# Patient Record
Sex: FEMALE | Race: WHITE
[De-identification: names, ages, dates, MRNs, and addresses within clinical notes are randomized per-mention and may not be internally consistent; named-entity substitution may affect disease eponyms.]

---

## 2017-03-20 ENCOUNTER — HOSPITAL ENCOUNTER (OUTPATIENT)
Dept: HOSPITAL 75 - PREOP | Age: 67
Discharge: HOME | End: 2017-03-20
Attending: SURGERY
Payer: MEDICARE

## 2017-03-20 ENCOUNTER — HOSPITAL ENCOUNTER (OUTPATIENT)
Dept: HOSPITAL 75 - SDC | Age: 67
End: 2017-03-20
Attending: NURSE PRACTITIONER
Payer: MEDICARE

## 2017-03-20 VITALS — DIASTOLIC BLOOD PRESSURE: 81 MMHG | SYSTOLIC BLOOD PRESSURE: 169 MMHG

## 2017-03-20 VITALS — DIASTOLIC BLOOD PRESSURE: 78 MMHG | SYSTOLIC BLOOD PRESSURE: 171 MMHG

## 2017-03-20 VITALS — SYSTOLIC BLOOD PRESSURE: 171 MMHG | DIASTOLIC BLOOD PRESSURE: 78 MMHG

## 2017-03-20 VITALS — WEIGHT: 241 LBS | BODY MASS INDEX: 41.15 KG/M2 | HEIGHT: 64 IN

## 2017-03-20 VITALS — SYSTOLIC BLOOD PRESSURE: 162 MMHG | DIASTOLIC BLOOD PRESSURE: 86 MMHG

## 2017-03-20 VITALS — BODY MASS INDEX: 41.15 KG/M2 | HEIGHT: 64 IN | WEIGHT: 241 LBS

## 2017-03-20 VITALS — DIASTOLIC BLOOD PRESSURE: 77 MMHG | SYSTOLIC BLOOD PRESSURE: 175 MMHG

## 2017-03-20 VITALS — DIASTOLIC BLOOD PRESSURE: 86 MMHG | SYSTOLIC BLOOD PRESSURE: 162 MMHG

## 2017-03-20 VITALS — DIASTOLIC BLOOD PRESSURE: 75 MMHG | SYSTOLIC BLOOD PRESSURE: 186 MMHG

## 2017-03-20 VITALS — SYSTOLIC BLOOD PRESSURE: 175 MMHG | DIASTOLIC BLOOD PRESSURE: 77 MMHG

## 2017-03-20 DIAGNOSIS — K21.9: ICD-10-CM

## 2017-03-20 DIAGNOSIS — D64.9: Primary | ICD-10-CM

## 2017-03-20 DIAGNOSIS — Z01.818: Primary | ICD-10-CM

## 2017-03-20 DIAGNOSIS — Z11.2: ICD-10-CM

## 2017-03-20 PROCEDURE — 85014 HEMATOCRIT: CPT

## 2017-03-20 PROCEDURE — 86901 BLOOD TYPING SEROLOGIC RH(D): CPT

## 2017-03-20 PROCEDURE — 86920 COMPATIBILITY TEST SPIN: CPT

## 2017-03-20 PROCEDURE — 86900 BLOOD TYPING SEROLOGIC ABO: CPT

## 2017-03-20 PROCEDURE — 36415 COLL VENOUS BLD VENIPUNCTURE: CPT

## 2017-03-20 PROCEDURE — 87081 CULTURE SCREEN ONLY: CPT

## 2017-03-20 PROCEDURE — 85018 HEMOGLOBIN: CPT

## 2017-03-20 PROCEDURE — 86850 RBC ANTIBODY SCREEN: CPT

## 2017-03-20 PROCEDURE — 36430 TRANSFUSION BLD/BLD COMPNT: CPT

## 2017-03-20 NOTE — XMS REPORT
Continuity of Care Document

 Created on: 2015



RAY SILVESTRE

External Reference #: [id number]

: 1950

Sex: Female



Demographics







 Address  [address] [place]  [address]

 

 Home Phone  [phone]

 

 Preferred Language  English

 

 Marital Status  Unknown

 

 Ep[employer]opalian Affiliation  Unknown

 

 Race  Unknown

 

 Ethnic Group  Unknown





Author







 Author  [facility]

 

 Organization  [facility]

 

 Address  Unknown

 

 Phone  Unavailable







Support







 Name  Relationship  Address  Phone

 

 BRITTNI PIPER MD  Caregiver  Unknown  [phone]

 

 HERRERA SESAY MD  Caregiver  [address], [place]  [address] [phone]

 

 ERIC SILVESTREHAWNA  Next Of Kin  [address] 

[place]  [address]  [phone]







Care Team Providers







 Care Team Member Name  Role  Phone

 

 BRITTNI PIPER MD  PCP  [phone]







Insurance Providers







 Payer Name  Policy Number  Subscriber Name  Relationship

 

 [employer] Medicare  [id number]  Ray Silvestre  18 Self / Same As Patient

 

 Blue [employer] [facility] Supp  [id number]  Ray Silvestre  18 Self / Same As Patient







Advance Directives







 Directive  Response  Recorded Date/Time

 

 Advance Directives  No  03/25/15 8:33am

 

 Health Care Power of   No  03/25/15 8:33am

 

 Organ Donor  Yes  03/25/15 8:33am

 

 Resuscitation Status  Full Code  03/25/15 8:33am







Problems

No known problems or medical conditions.



Medications







 Medication  Dose  Route  Sig  Days/Qty  Instructions  Order Date  Discontinued 
Date  Status

 

 Metformin HCl (Glucophage)  1 Each  PO  DAILY        10/26/11  12/12/11  
Discontinued

 

 Levothyroxine Sodium (Levothroid)  1 Each  PO  DAILY        10/26/11  03/25/15
  Discontinued

 

 Hydrochlorothiazide  1 Each  PO  DAILY        10/26/11  12/12/11  Discontinued

 

 Simvastatin  40 Mg  PO  DAILY        10/26/11  03/25/15  Discontinued

 

 Sertraline Hcl  100 Mg  PO  DAILY        10/26/11  03/25/15  Discontinued

 

 Orphenadrine Citrate  100 Mg  PO  TWICE A DAY        10/26/11  12/12/11  
Discontinued

 

 Topiramate  50 Mg  PO  AM        10/26/11  03/25/15  Discontinued

 

 Topiramate  100 Mg  PO  BEDTIME        10/26/11  03/25/15  Discontinued

 

 Acetaminophen/Hydrocodone Bitart  1 Each  PO  Q6 PRN     DO NOT TAKE THIS MED 
WHILE  10/26/11  03/25/15  Discontinued

 

 Fexofenadine Hcl  180 Mg  PO  DAILY PRN        10/26/11  01/25/12  Discontinued

 

 Multivitamins  1 Tab  PO  DAILY        10/26/11  03/25/15  Discontinued

 

 Calcium Carbonate/Vitamin D3  1 Each  PO  DAILY        10/26/11  03/25/15  
Discontinued

 

 Sitagliptin Phosphate  1 Each  PO  DAILY        11  
Discontinued

 

 Lisinopril  40 Mg  PO  DAILY        11  Discontinued

 

 Esomeprazole Magnesium  1 Cap  PO  DAILY        11  
Discontinued

 

 Oxycodone Hcl/Acetaminophen  1 - 2 Each  PO  4-6HRS PRN  40 Qty   MOUTH EVERY 4
-6 HRS AS  11  Discontinued

 

 Omeprazole  40 Mg  PO  DAILY        01/25/12  03/25/15  Discontinued

 

 Fenofibrate  1 Each  PO  DAILY        12  Discontinued

 

 Linagliptin  5 Mg  PO  DAILY        09/14/12  03/25/15  Discontinued

 

 Dicyclomine Hcl  10 Mg  PO  THREE TIMES A DAY        09/14/12  03/25/15  
Discontinued

 

 Simvastatin  40 Mg  PO  DAILY        09/14/12  03/25/15  Discontinued

 

 Loratadine  10 Mg  PO  DAILY        09/14/12  03/25/15  Discontinued

 

 Cranberry Extract  200 Mg  PO  DAILY        09/14/12  03/25/15  Discontinued

 

 Cholecalciferol (Vitamin D3)  4,000 Unit  PO  DAILY        09/14/12  03/25/15  
Discontinued

 

 Topiramate  100 Mg  PO  TWICE A DAY        03/25/15     Active

 

 Prenatal Vit/Fe Fumarate/Fa  1 Tab  PO  DAILY        03/25/15     Active

 

 Ca Carbonate/Vitamin D3/Vit K  1 Tab  PO  DAILY        03/25/15     Active

 

 Cholecalciferol (Vitamin D3)  1,000 Unit  PO  DAILY        03/25/15     Active

 

 Metformin HCl (Glucophage)  750 Mg  PO  TWICE A DAY WITH MEALS        03/25/15
  03/25/15  Discontinued

 

 Oxybutynin Chloride (Ditropan)  5 Mg  PO  TWICE A DAY        03/25/15     
Active

 

 Cyanocobalamin  2,500 Mcg  SL  DAILY        03/25/15     Active

 

 Hydrochlorothiazide  25 Mg  PO  DAILY        03/25/15     Active

 

 Potassium Chloride  20 Meq  PO  DAILY        03/25/15     Active

 

 Atorvastatin  20 Mg  PO  DAILY        03/25/15     Active

 

 Levothyroxine Sodium (Levothroid)  100 Mcg  PO  DAILY        03/25/15     
Active

 

 Omeprazole  20 Mg  PO  DAILY        03/25/15  03/25/15  Discontinued

 

 Linaclotide  145 Mcg  PO  DAILY        03/25/15     Active

 

 Esomeprazole Magnesium  40 Mg  PO  1000        03/25/15  03/25/15  Discontinued

 

 Tetrahydrozoline Hcl  1 Drop  OU  DAILY PRN DRY EYES        03/25/15     Active

 

 Metformin Hcl  750 Mg  PO  TWICE A DAY     TAKES 1 & 1/2 (500MG) TABLET  03/25/
15     Active

 

 Pantoprazole Sodium  40 Mg  PO  TWICE A DAY  90 Qty     03/25/15     Active







Social History







 Social History Problem  Response  Recorded Date/Time

 

 Recent Foreign Travel  No  2015 8:47am

 

 Smoking Status  Former Smoker  2015 8:40am

 

 Do you dip or chew tobacco?  No  2015 8:40am









 Query  Response  Start Date  Stop Date

 

 Smoking Status  Former Smoker     1987







Hospital Discharge Instructions

No hospital discharge instructions.



Plan of Care

No plan of care.



Functional Status

No functional status results.



Allergies, Adverse Reactions, Alerts







 Allergen  Type  Severity  Reaction  Status  Last Updated

 

 Adhesive Bandage  Allergy  Unknown     Active  03/25/15







Immunizations







 Name  Given  Type

 

 Date of Pneumonia Vaccine  14  Historical

 

 Date of Influenza Vaccine  10/24/14  Historical







Vital Signs

Acute Vital Signs





 Vital  Response  Date/Time

 

 Temperature (Fahrenheit)  97.5 degrees F (97.6 - 99.5)   

 

 Temperature (Calculated Celsius)  36.10999 degrees C (36.4 - 37.5)   

 

 Temperature Source  Tympanic     

 

 Pulse Rate (adult)  68 bpm (60 - 90)   

 

 Respiratory Rate  18 bpm (12 - 24)   

 

 O2 Sat by Pulse Oximetry  97 % (88 - 100)   

 

 Blood Pressure  108/53 mm Hg   

 

 Pain      

 

    Pain Intensity  0     

 

 Height (Feet)  5 feet    

 

 Height (Inches)  4.00 inches    

 

 Height (Calculated Centimeters)  162.621513 cm    

 

 Weight (Pounds)  234 pounds    

 

 Weight (Calculated Grams)  015097.616 gm    

 

 Weight (Calculated Kilograms)  106.776812 kilograms    

 

 Height  5 ft 4 in    

 

 Weight  234 lb    

 

 Body Mass Index  40.2 kg/m^2    







Results

No known relevant diagnostic tests, laboratory data and/or discharge summary.



Procedures







 Procedure  Status  Date  Provider(s)

 

 Esophagogastroduodenoscopy (EGD) with dilation  completed  03/25/15  HERRERA SESAY MD







Encounters







 Encounter  Location  Date/Time

 

 Registered Surgical Day Care  [facility]  03/25/15 8:05am

 

 Registered Clinic  [facility]  03/19/15 9:48am

## 2017-03-20 NOTE — XMS REPORT
Continuity of Care Document

 Created on: 2015



RAY SILVESTRE

External Reference #: [id number]

: 1950

Sex: Female



Demographics







 Address  [address] [place]  [address]

 

 Home Phone  [phone]

 

 Preferred Language  English

 

 Marital Status  Unknown

 

 Yarsani Affiliation  Unknown

 

 Race  Unknown

 

 Ethnic Group  Unknown





Author







 Author  [facility]

 

 Organization  [facility]

 

 Address  Unknown

 

 Phone  Unavailable







Support







 Name  Relationship  Address  Phone

 

 BRITTNI PIPER MD  Caregiver  Unknown  [phone]

 

 HERRERA SESAY MD  Caregiver  [address] [facility] [address] [facility], [place]  [address] [phone]

 

 ERIC SILVESTREHAWNA  Next Of Kin  [address] 

[place]  [address]  [phone]







Care Team Providers







 Care Team Member Name  Role  Phone

 

 BRITTNI PIPER MD  PCP  [phone]







Insurance Providers







 Payer Name  Policy Number  Subscriber Name  Relationship

 

 [employer] Medicare  [id number]  Ray Silvestre  18 Self / Same As Patient

 

 Blue Cross [facility] Supp  [id number]  Ray Silvestre  18 Self / Same As Patient







Advance Directives







 Directive  Response  Recorded Date/Time

 

 Advance Directives  No  03/25/15 8:33am

 

 Health Care Power of   No  03/25/15 8:33am

 

 Organ Donor  Yes  03/25/15 8:33am

 

 Resuscitation Status  Full Code  03/25/15 8:33am







Problems

No known problems or medical conditions.



Medications







 Medication  Dose  Route  Sig  Days/Qty  Instructions  Order Date  Discontinued 
Date  Status

 

 Metformin HCl (Glucophage)  1 Each  PO  DAILY        10/26/11  12/12/11  
Discontinued

 

 Levothyroxine Sodium (Levothroid)  1 Each  PO  DAILY        10/26/11  03/25/15
  Discontinued

 

 Hydrochlorothiazide  1 Each  PO  DAILY        10/26/11  12/12/11  Discontinued

 

 Simvastatin  40 Mg  PO  DAILY        10/26/11  03/25/15  Discontinued

 

 Sertraline Hcl  100 Mg  PO  DAILY        10/26/11  03/25/15  Discontinued

 

 Orphenadrine Citrate  100 Mg  PO  TWICE A DAY        10/26/11  12/12/11  
Discontinued

 

 Topiramate  50 Mg  PO  AM        10/26/11  03/25/15  Discontinued

 

 Topiramate  100 Mg  PO  BEDTIME        10/26/11  03/25/15  Discontinued

 

 Acetaminophen/Hydrocodone Bitart  1 Each  PO  Q6 PRN     DO NOT TAKE THIS MED 
WHILE  10/26/11  03/25/15  Discontinued

 

 Fexofenadine Hcl  180 Mg  PO  DAILY PRN        10/26/11  01/25/12  Discontinued

 

 Multivitamins  1 Tab  PO  DAILY        10/26/11  03/25/15  Discontinued

 

 Calcium Carbonate/Vitamin D3  1 Each  PO  DAILY        10/26/11  03/25/15  
Discontinued

 

 Sitagliptin Phosphate  1 Each  PO  DAILY        11  
Discontinued

 

 Lisinopril  40 Mg  PO  DAILY        11  Discontinued

 

 Esomeprazole Magnesium  1 Cap  PO  DAILY        11  
Discontinued

 

 Oxycodone Hcl/Acetaminophen  1 - 2 Each  PO  4-6HRS PRN  40 Qty   MOUTH EVERY 4
-6 HRS AS  11  Discontinued

 

 Omeprazole  40 Mg  PO  DAILY        01/25/12  03/25/15  Discontinued

 

 Fenofibrate  1 Each  PO  DAILY        12  Discontinued

 

 Linagliptin  5 Mg  PO  DAILY        09/14/12  03/25/15  Discontinued

 

 Dicyclomine Hcl  10 Mg  PO  THREE TIMES A DAY        09/14/12  03/25/15  
Discontinued

 

 Simvastatin  40 Mg  PO  DAILY        09/14/12  03/25/15  Discontinued

 

 Loratadine  10 Mg  PO  DAILY        09/14/12  03/25/15  Discontinued

 

 Cranberry Extract  200 Mg  PO  DAILY        09/14/12  03/25/15  Discontinued

 

 Cholecalciferol (Vitamin D3)  4,000 Unit  PO  DAILY        09/14/12  03/25/15  
Discontinued

 

 Topiramate  100 Mg  PO  TWICE A DAY        03/25/15     Active

 

 Prenatal Vit/Fe Fumarate/Fa  1 Tab  PO  DAILY        03/25/15     Active

 

 Ca Carbonate/Vitamin D3/Vit K  1 Tab  PO  DAILY        03/25/15     Active

 

 Cholecalciferol (Vitamin D3)  1,000 Unit  PO  DAILY        03/25/15     Active

 

 Metformin HCl (Glucophage)  750 Mg  PO  TWICE A DAY WITH MEALS        03/25/15
  03/25/15  Discontinued

 

 Oxybutynin Chloride (Ditropan)  5 Mg  PO  TWICE A DAY        03/25/15     
Active

 

 Cyanocobalamin  2,500 Mcg  SL  DAILY        03/25/15     Active

 

 Hydrochlorothiazide  25 Mg  PO  DAILY        03/25/15     Active

 

 Potassium Chloride  20 Meq  PO  DAILY        03/25/15     Active

 

 Atorvastatin  20 Mg  PO  DAILY        03/25/15     Active

 

 Levothyroxine Sodium (Levothroid)  100 Mcg  PO  DAILY        03/25/15     
Active

 

 Omeprazole  20 Mg  PO  DAILY        03/25/15  03/25/15  Discontinued

 

 Linaclotide  145 Mcg  PO  DAILY        03/25/15     Active

 

 Esomeprazole Magnesium  40 Mg  PO  1000        03/25/15  03/25/15  Discontinued

 

 Tetrahydrozoline Hcl  1 Drop  OU  DAILY PRN DRY EYES        03/25/15     Active

 

 Metformin Hcl  750 Mg  PO  TWICE A DAY     TAKES 1 & 1/2 (500MG) TABLET  03/25/
15     Active

 

 Pantoprazole Sodium  40 Mg  PO  TWICE A DAY  90 Qty     03/25/15     Active







Social History







 Social History Problem  Response  Recorded Date/Time

 

 Recent Foreign Travel  No  2015 8:47am

 

 Smoking Status  Former Smoker  2015 8:40am

 

 Do you dip or chew tobacco?  No  2015 8:40am









 Query  Response  Start Date  Stop Date

 

 Smoking Status  Former Smoker     1987







Hospital Discharge Instructions

No hospital discharge instructions.



Plan of Care

No plan of care.



Functional Status

No functional status results.



Allergies, Adverse Reactions, Alerts







 Allergen  Type  Severity  Reaction  Status  Last Updated

 

 Adhesive Bandage  Allergy  Unknown     Active  03/25/15







Immunizations







 Name  Given  Type

 

 Date of Pneumonia Vaccine  14  Historical

 

 Date of Influenza Vaccine  10/24/14  Historical







Vital Signs

Acute Vital Signs





 Vital  Response  Date/Time

 

 Temperature (Fahrenheit)  97.5 degrees F (97.6 - 99.5)   

 

 Temperature (Calculated Celsius)  36.99439 degrees C (36.4 - 37.5)   

 

 Temperature Source  Tympanic     

 

 Pulse Rate (adult)  68 bpm (60 - 90)   

 

 Respiratory Rate  18 bpm (12 - 24)   

 

 O2 Sat by Pulse Oximetry  97 % (88 - 100)   

 

 Blood Pressure  108/53 mm Hg   

 

 Pain      

 

    Pain Intensity  0     

 

 Height (Feet)  5 feet    

 

 Height (Inches)  4.00 inches    

 

 Height (Calculated Centimeters)  162.285115 cm    

 

 Weight (Pounds)  234 pounds    

 

 Weight (Calculated Grams)  414736.616 gm    

 

 Weight (Calculated Kilograms)  106.370680 kilograms    

 

 Height  5 ft 4 in    

 

 Weight  234 lb    

 

 Body Mass Index  40.2 kg/m^2    







Results

No known relevant diagnostic tests, laboratory data and/or discharge summary.



Procedures







 Procedure  Status  Date  Provider(s)

 

 Esophagogastroduodenoscopy (EGD) with dilation  completed  03/25/15  HERRERA SESAY MD







Encounters







 Encounter  Location  Date/Time

 

 Registered Surgical Day Care  [facility]  03/25/15 8:05am

 

 Registered Clinic  [facility]  03/19/15 9:48am

## 2017-03-23 ENCOUNTER — HOSPITAL ENCOUNTER (INPATIENT)
Dept: HOSPITAL 75 - 4TH | Age: 67
Discharge: HOME | DRG: 989 | End: 2017-03-23
Attending: SURGERY | Admitting: SURGERY
Payer: MEDICARE

## 2017-03-23 VITALS — HEIGHT: 64 IN | BODY MASS INDEX: 41.15 KG/M2 | WEIGHT: 241 LBS

## 2017-03-23 VITALS — DIASTOLIC BLOOD PRESSURE: 68 MMHG | SYSTOLIC BLOOD PRESSURE: 131 MMHG

## 2017-03-23 VITALS — SYSTOLIC BLOOD PRESSURE: 137 MMHG | DIASTOLIC BLOOD PRESSURE: 61 MMHG

## 2017-03-23 VITALS — SYSTOLIC BLOOD PRESSURE: 131 MMHG | DIASTOLIC BLOOD PRESSURE: 68 MMHG

## 2017-03-23 VITALS — SYSTOLIC BLOOD PRESSURE: 130 MMHG | DIASTOLIC BLOOD PRESSURE: 61 MMHG

## 2017-03-23 VITALS — DIASTOLIC BLOOD PRESSURE: 79 MMHG | SYSTOLIC BLOOD PRESSURE: 144 MMHG

## 2017-03-23 DIAGNOSIS — I10: ICD-10-CM

## 2017-03-23 DIAGNOSIS — E11.9: ICD-10-CM

## 2017-03-23 DIAGNOSIS — D64.9: ICD-10-CM

## 2017-03-23 DIAGNOSIS — K95.09: Primary | ICD-10-CM

## 2017-03-23 DIAGNOSIS — G47.33: ICD-10-CM

## 2017-03-23 DIAGNOSIS — K21.9: ICD-10-CM

## 2017-03-23 DIAGNOSIS — K27.9: ICD-10-CM

## 2017-03-23 LAB
BASOPHILS # BLD AUTO: 0.1 10^3/UL (ref 0–0.1)
BASOPHILS NFR BLD AUTO: 1 % (ref 0–10)
EOSINOPHIL # BLD AUTO: 0.3 10^3/UL (ref 0–0.3)
EOSINOPHIL NFR BLD AUTO: 4 % (ref 0–10)
ERYTHROCYTE [DISTWIDTH] IN BLOOD BY AUTOMATED COUNT: 23.5 % (ref 10–14.5)
LYMPHOCYTES # BLD AUTO: 1.9 X 10^3 (ref 1–4)
LYMPHOCYTES NFR BLD AUTO: 26 % (ref 12–44)
MCH RBC QN AUTO: 21 PG (ref 25–34)
MCHC RBC AUTO-ENTMCNC: 30 G/DL (ref 32–36)
MCV RBC AUTO: 70 FL (ref 80–99)
MONOCYTES # BLD AUTO: 0.5 X 10^3 (ref 0–1)
MONOCYTES NFR BLD AUTO: 8 % (ref 0–12)
NEUTROPHILS # BLD AUTO: 4.5 X 10^3 (ref 1.8–7.8)
NEUTROPHILS NFR BLD AUTO: 62 % (ref 42–75)
PLATELET # BLD: 267 10^3/UL (ref 130–400)
PMV BLD AUTO: 10 FL (ref 7.4–10.4)
RBC # BLD AUTO: 4.96 10^6/UL (ref 4.35–5.85)
WBC # BLD AUTO: 7.2 10^3/UL (ref 4.3–11)

## 2017-03-23 PROCEDURE — 82962 GLUCOSE BLOOD TEST: CPT

## 2017-03-23 PROCEDURE — 85014 HEMATOCRIT: CPT

## 2017-03-23 PROCEDURE — 86901 BLOOD TYPING SEROLOGIC RH(D): CPT

## 2017-03-23 PROCEDURE — 85025 COMPLETE CBC W/AUTO DIFF WBC: CPT

## 2017-03-23 PROCEDURE — 36430 TRANSFUSION BLD/BLD COMPNT: CPT

## 2017-03-23 PROCEDURE — 0DP64CZ REMOVAL OF EXTRALUMINAL DEVICE FROM STOMACH, PERCUTANEOUS ENDOSCOPIC APPROACH: ICD-10-PCS | Performed by: SURGERY

## 2017-03-23 PROCEDURE — 85018 HEMOGLOBIN: CPT

## 2017-03-23 PROCEDURE — 94664 DEMO&/EVAL PT USE INHALER: CPT

## 2017-03-23 PROCEDURE — 36415 COLL VENOUS BLD VENIPUNCTURE: CPT

## 2017-03-23 RX ADMIN — SODIUM CHLORIDE, SODIUM LACTATE, POTASSIUM CHLORIDE, AND CALCIUM CHLORIDE PRN MLS/HR: 600; 310; 30; 20 INJECTION, SOLUTION INTRAVENOUS at 06:40

## 2017-03-23 RX ADMIN — MORPHINE SULFATE PRN MG: 10 INJECTION, SOLUTION INTRAMUSCULAR; INTRAVENOUS at 10:38

## 2017-03-23 RX ADMIN — MORPHINE SULFATE PRN MG: 10 INJECTION, SOLUTION INTRAMUSCULAR; INTRAVENOUS at 10:31

## 2017-03-23 RX ADMIN — SODIUM CHLORIDE, SODIUM LACTATE, POTASSIUM CHLORIDE, AND CALCIUM CHLORIDE PRN MLS/HR: 600; 310; 30; 20 INJECTION, SOLUTION INTRAVENOUS at 09:32

## 2017-03-23 NOTE — ANESTHESIA-GENERAL POST-OP
General


Patient Condition


Mental Status/LOC:  Same as Preop


Cardiovascular:  Satisfactory


Nausea/Vomiting:  Absent


Respiratory:  Satisfactory


Pain:  Controlled


Complications:  Absent





Post Op Complications


Complications


None





Follow Up Care/Instructions


Patient Instructions


None needed.





Anesthesia/Patient Condition


Patient Condition


Patient is doing well, no complaints, stable vital signs, no apparent adverse 

anesthesia problems.   


No complications reported per nursing.


D/C home per [facility] Criteria:  ESHA Goyal DO Mar 23, 2017 11:03

## 2017-03-23 NOTE — OPERATIVE REPORT
PROCEDURE PHYSICIAN:   HERRERA SERNA

 

DATE OF PROCEDURE:  

03/23/2017

 

ATTENDING PRIMARY CARE PHYSICIAN: Dr. Federica Sin. 

 

PREOPERATIVE DIAGNOSIS:

1.   Gastroesophageal reflux disease.

2.   Peptic ulcer disease.

3.   Anemia requiring blood transfusion. 

 

POSTOPERATIVE DIAGNOSIS: 

1.   Gastroesophageal reflux disease.

2.   Peptic ulcer disease.

3.   Anemia requiring blood transfusion.  

4.   Mild inferior gastric restrictive device slippage. 

 

PROCEDURE:

Diagnostic laparoscopy and removal gastric restrictive device and

access port. 

 

SURGEON:

Dr. Serna. 

 

ASSISTANT:

ARIELLE Wells  

 

ANESTHESIA:

General endotracheal. 

 

ESTIMATED BLOOD LOSS:

Minimal. 

 

FINDINGS:

Mild inferior band slippage. There was hepatomegaly and liver

steatosis. 

 

DISPOSITION:

The patient tolerated the procedure well. 

 

BRIEF HISTORY:

Ms. Clarissa Rosas is a 66-year-old female known to us. She is

status post laparoscopic adjustable gastric band with an [employer]er[employer]

BG band July 2006 in [place]. She has followed-up with us on

multiple times for postoperative counseling as well as

adjustments. She has had a long-standing history of

gastroesophageal reflux disease; however, has had worsening

issues with reflux as well as regurgitation despite having fluid

removed from the band. She does state that she exercises

regularly at the [facility] 3 to 4 times a week for 60 minutes

sessions. She does follow a high protein diet with lean meat

protein sources and does take at least 64 fluid ounces of water

daily. She has taken multiple PPI acid reducers including Nexium,

Protonix and Carafate. She was found to be anemic on several

occasions requiring blood transfusion well. 

 

The patient was brought to the operating room, laid supine on the

table. After adequate IV pain and sedative medications and

general endotracheal intubation the abdomen was prepped and

draped in the standard surgical fashion. 

 

0.5% Marcaine with epinephrine was then used to anesthetize the

overlying skin in left upper abdominal quadrant. A small

transverse incision made using a 15 blade. An 0 silk suture was

applied to the medial aspect of the incision for retraction. A

Veress needle inserted with a low opening pressure of 0 mmHg the

abdomen was then insufflated to 15 mmHg pressure. The Veress

needle removed and a 5 mm Xcel trocar placed followed by a 5 mm,

45 degrees angle laparoscope, visualizing the peritoneal cavity.

 

 

A four-quadrant abdominal exploration was performed. The gastric

restrictive device was not visualized at this time. There was 

moderate hepatomegaly with liver steatosis identified as well.

Under direct visualization we then proceeded to place the two mid

abdominal 10 mm ports after the skin and peritoneum were

anesthetized using 0.5% Marcaine and a transverse skin incision

made using a 15 blade. An area in the epigastric region was then

anesthetized and a small skin incision made using an 11 blade.

Through this Incision a tract through the abdominal wall layers

was created using trocar to a 5 mm port. Through this opening, a

medium size Emery liver retractor was placed in the left lobe

of the liver and retracted anteriorly and superiorly. 

 

The patient was then placed in a steep reverse Trendelenburg

position. The gastric restrictive device was identified and

appeared to be a slightly enlarged gastric pouch and a mild

inferior band slippage. Due to this and her history of anemia it

was decided to remove the gastric restrictive device as well at

the access port. The adhesion tissue to the band buckle was taken

down using Sonicision. The band was then unclasped.   The band

tubing was cut next to the port and the entire band removed

intact. Good hemostasis was observed.  

 

The 10 mm port, fascia and peritoneum were then closed under

direct visualization using a Lj-Yvonne device and 0 Vicryl

suture. The abdomen was desufflated. The skin incision to the mid

abdominal right of midline port was then extended laterally and

the entire port attached to the fascia as well as the sutures

were removed using electrocautery. Good hemostasis was observed.

The subcutaneous tissue was then reapproximated using 3-0 Vicryl

interrupted sutures. Remaining port removed and all skin

incisions were closed using 4-0 Monocryl running subcuticular

sutures. Wounds were then cleaned and covered Dermabond. 

 

The patient tolerated the procedure well. We will start IV pain

and oral medication as well as a clear liquid diet. Once she is

tolerating clears, has good pain control with oral pain

medications and ambulating well, we will discharge her home. We

will recommend a clear liquid diet for the next week and then

slowly advanced as tolerated. We will also want her to continue

with her PPI acid reducer on a daily basis. 

 

 

 

Job ID: [id number]

Dictated Date: 03/23/2017 10:18:01 

Transcription Date: 03/23/2017 11:52:23 / ap

## 2017-03-23 NOTE — PROGRESS NOTE-PRE OPERATIVE
Pre-Operative Progress Note


H&P Reviewed


The H&P was reviewed, patient examined and no changes noted.


Date H&P Reviewed:  Mar 23, 2017


Time H&P Reviewed:  07:40


Pre-Operative Diagnosis:  Reflux, regurgation, abdominal pain











AMY SEO Mar 23, 2017 07:53

## 2017-03-23 NOTE — PROGRESS NOTE-POST OPERATIVE
Post-Operative Progess Note


Assistant


farzad grey APRN





Pre-Operative Diagnosis


Reflux, regurgation, anemia, abdominal pain





Post-Operative Diagnosis





same, mild inferior band slippage





Post-Op Procedure Note


Date of Procedure:  Mar 23, 2017


Name of Procedure:  


diagnostic laparoscopy and adjustable gastric restrictitive device and


port removal


Anesthesia Type


GET


Estimated blood loss (mL):  minimal











HERRERA SESAY MD Mar 23, 2017 10:04 am

## 2017-03-23 NOTE — DISCHARGE INST-SURGICAL
D/C Lap Instructions-LÁZARO


New, Converted, or Re-Newed RX:  RX on Chart


Follow Up Appt in 2 weeks





Activity as tolerated


No driving for 24 hours


No driving while on pain medications





Incentive Spirometry use every 2 hours while awake





clear liquid  Diet 3 days then advance as tolerated





Symptoms to Report: Fever over 101 degree F, Nausea/Vomiting 


Infection Signs and Symptoms to report:  Increased redness, Foul odor of wound, 

Increased drainage





Bathing instructions: May shower


Operative Area Clean/Dry;  Keep incision clean/dry





If any problems/questions: Contact your physician or go to Emergency Room











HERRERA SESAY MD Mar 23, 2017 10:09 am

## 2017-03-26 NOTE — XMS REPORT
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 DX, LEFT                                                        
   2013                

 

                     49242                                                     
     BONE MINERAL DENSITY, HEEL US (IN HOUSE)                                  
                         08/15/2013                

 

                     39483                                                     
     DEXA BONE DENSITY, AXIAL                                                  
         08/15/2013                

 

                     63848                                                     
     INJ TENDON SHEATH/LIGAMENT                                                
           2013                

 

                     54631                                                     
     MAMMOGRAM, SCREENING                                                      
     2013                

 

                     81800                                                     
     ROUTINE VENIPUNCTURE                                                      
     2013                

 

                     70583                                                     
     A1C (IN-HOUSE)                                                                           

 

                     25602                                                     
     CMP                                                           2013  
              

 

                     32811                                                     
     LIPID PANEL                                                                           

 

                     [id number]                                                   
       GFR CALC (RESULT ONLY)                                                  
         2013                

 

                     21794                                                     
     TSH                                                           2013  
              

 

                                                                          
     FLU ADMINISTRATION (MEDICARE ONLY)                                        
                   2013                

 

                     85311                                                     
     A1C (IN-HOUSE)                                                                           

 

                     93490                                                     
     BARIUM SWALLOW XRAY MODIFIED                                              
             2014                

 

                     66189                                                     
     US THYROID ULTRASOUND                                                     
      2014                

 

                     03769                                                     
     TSH                                                           2014  
              

 

                     86900                                                     
     CBC                                                           2014  
              

 

                     01248                                                     
     ROUTINE VENIPUNCTURE                                                      
     2014                

 

                     50024                                                     
     CBC                                                           2014  
              

 

                     24326                                                     
     T4 FREE                                                           2014                

 

                     97337                                                     
     T3 TOTAL                                                           2014                

 

                     23286                                                     
     ROUTINE VENIPUNCTURE                                                      
     2014                

 

                     51539                                                     
     IRON SERUM                                                           2014                

 

                     45198                                                     
     IRON BNDNG CAP                                                                           

 

                     03927                                                     
     T4 FREE                                                           2014                

 

                     56876                                                     
     TSH                                                           2014  
              

 

                     05752                                                     
     FERRITIN                                                           2014                

 

                     [id number]                                                   
       IMMATURE PLATELET FRACTION (RESULT ONLY)                                
                           2014                

 

                     [id number]                                                   
       COMPLETE BLOOD COUNT NO  DIFF (CBC Result)                              
                             2014                

 

                     38671                                                     
     DIFFERENTIAL WBC COUNT (CBC DIFF RESULT)                                  
                         2014                

 

                     32658                                                     
     RETICULOCYTE COUNT                                                        
   2014                

 

                     93916                                                     
     PERIPHERAL BLOOD SMEAR W/ PATH REPORT                                     
                      07/10/2014                

 

                     IRGROUP                                                   
       IRON GROUP (Iron,TIBC, Ferritin)                                        
                   07/10/2014                

 

                     85227                                                     
     PERIPHERIAL BLOOD SMEAR                                                   
        07/10/2014                

 

                     [id number]                                                   
       HEMATOLOGY OTHER REPORT                                                 
          07/10/2014                

 

                     90333                                                     
     HEMOCCULT                                                           2014                

 

                     09375                                                     
     HEMOCCULT                                                           2014                



                                                                               
                                                                               
                                                                               
                                                                               
                                                                               
                                                                         



Results

                      





 Test                    Result                    Range                









 RED CELLS LEUKO REDUCED AS1 - 17 13:14                









 RED CELLS LEUKO REDUCED AS1                                   TRANSFUSED      1409                       NRG                









 Blood type T Indirect antibody screen panel - 17 13:14                









 ABO+Rh group                    AP                     NRG                

 

 Transfusion band number                    [id number]                     NRG    
            

 

 Blood group antibody screen                    NEGATIVE                     
NRG                









 Methicillin resistant Staphylococcus aureus (MRSA) screening culture -  13:19                









 Methicillin resistant Staphylococcus aureus (MRSA) screening culture          
          NEG                     NRG                









 Whole blood hemoglobin and hematocrit panel - 17 21:30                









 Venous blood hemoglobin measurement (mass/volume)                    9.6 g/dL 
                   11.5-16.0                

 

 Blood hematocrit (volume fraction)                    32 %                    
35-52                









 Complete blood count (CBC) with automated white blood cell (WBC) differential 
- 17 06:41                









 Blood leukocytes automated count (number/volume)                    7.2 10*3/
uL                    4.3-11.0                

 

 Blood erythrocytes automated count (number/volume)                    4.96 10*6
/uL                    4.35-5.85                

 

 Venous blood hemoglobin measurement (mass/volume)                    10.4 g/dL
                    11.5-16.0                

 

 Blood hematocrit (volume fraction)                    35 %                    
35-52                

 

 Automated erythrocyte mean corpuscular volume                    70 [foz_us]  
                  80-99                

 

 Automated erythrocyte mean corpuscular hemoglobin (mass per erythrocyte)      
              21 pg                    25-34                

 

 Automated erythrocyte mean corpuscular hemoglobin concentration measurement (
mass/volume)                    30 g/dL                    32-36                

 

 Automated erythrocyte distribution width ratio                    23.5 %      
              10.0-14.5                

 

 Automated blood platelet count (count/volume)                    267 10*3/uL  
                  130-400                

 

 Automated blood platelet mean volume measurement                    10.0 [foz_
us]                    7.4-10.4                

 

 Automated blood neutrophils/100 leukocytes                    62 %            
        42-75                

 

 Automated blood lymphocytes/100 leukocytes                    26 %            
        12-44                

 

 Blood monocytes/100 leukocytes                    8 %                    0-12 
               

 

 Automated blood eosinophils/100 leukocytes                    4 %             
       0-10                

 

 Automated blood basophils/100 leukocytes                    1 %               
     0-10                

 

 Blood neutrophils automated count (number/volume)                    4.5 10*3 
                   1.8-7.8                

 

 Blood lymphocytes automated count (number/volume)                    1.9 10*3 
                   1.0-4.0                

 

 Blood monocytes automated count (number/volume)                    0.5 10*3   
                 0.0-1.0                

 

 Automated eosinophil count                    0.3 10*3/uL                    
0.0-0.3                

 

 Automated blood basophil count (count/volume)                    0.1 10*3/uL  
                  0.0-0.1                









 Capillary blood glucose measurement by glucometer (mass/volume) - 17 06:
59                









 Capillary blood glucose measurement by glucometer (mass/volume)               
     147 mg/dL                                    



                                                                               
                                                 



Encounters

                      





 ACCT No.                    Visit Date/Time                    Discharge      
              Status                    Pt. Type                    Provider   
                 Facility                    Loc./Unit                    
Complaint                

 

 [id number]                    2014 09:39:00                    2014 23:
59:59                    [facility]                    Outpatient                    
AYAD IBARRA MD                                                              
                 

 

 [id number]                    2014 13:32:00                    2014 23:
59:59                    [facility]                    Outpatient                    
CHRISTINE AHUJA MD                                                            
                   

 

 [id number]                    2014 12:02:00                    2014 23:
59:59                    [facility]                    Outpatient                    
CHRISTINE AHUJA MD                                                            
                   

 

 [id number]                    2014 09:30:00                    2014 23:
59:59                    [facility]                    Outpatient                    
WINSOME MULLINS DO                                                               
                

 

 [id number]                    2014 08:53:00                    2014 23:
59:59                    [facility]                    Outpatient                    
CHRISTINE AHUJA MD                                                            
                   

 

 [id number]                    2014 13:30:00                    2014 23:
59:59                    [facility]                    Outpatient                    
CHRISTINE AHUJA MD                                                            
                   

 

 [id number]                    2014 08:48:00                    2014 23:
59:59                    [facility]                    Outpatient                    
CHRISTINE AHUJA MD                                                            
                   

 

 [id number]                    2014 09:40:00                    2014 23:
59:59                    [facility]                    Outpatient                    
CHRISTINE AHUJA MD                                                            
                   

 

 [id number]                    2014 09:08:00                    2014 23:
59:59                    CLS                    Outpatient                    
CHRISTINE AHUJA MD                                                            
                   

 

 [id number]                    2013 15:35:00                    2013 23:
59:59                    CLS                    Outpatient                    
WINSOME MULLINS DO                                                               
                

 

 [id number]                    2013 10:32:00                                 
                             Document Registration                             
                                                                       

 

 [id number]                    2013 13:48:00                                 
                             Document Registration                             
                                                                       

 

 [id number]                    08/15/2013 09:58:00                                 
                             Document Registration

## 2017-03-26 NOTE — XMS REPORT
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                                                     
     MAMMOGRAM DX, LEFT                                                        
   2013                

 

                     91907                                                     
     BONE MINERAL DENSITY, HEEL US (IN HOUSE)                                  
                         08/15/2013                

 

                     80633                                                     
     DEXA BONE DENSITY, AXIAL                                                  
         08/15/2013                

 

                     67050                                                     
     INJ TENDON SHEATH/LIGAMENT                                                
           2013                

 

                     46612                                                     
     MAMMOGRAM, SCREENING                                                      
     2013                

 

                     06494                                                     
     ROUTINE VENIPUNCTURE                                                      
     2013                

 

                     86079                                                     
     A1C (IN-HOUSE)                                                                           

 

                     28075                                                     
     CMP                                                           2013  
              

 

                     88419                                                     
     LIPID PANEL                                                                           

 

                     [id number]                                                   
       GFR CALC (RESULT ONLY)                                                  
         2013                

 

                     92901                                                     
     TSH                                                           2013  
              

 

                                                                          
     FLU ADMINISTRATION (MEDICARE ONLY)                                        
                   2013                

 

                     98165                                                     
     A1C (IN-HOUSE)                                                                           

 

                     22651                                                     
     BARIUM SWALLOW XRAY MODIFIED                                              
             2014                

 

                     51704                                                     
     US THYROID ULTRASOUND                                                     
      2014                

 

                     48040                                                     
     TSH                                                           2014  
              

 

                     81084                                                     
     CBC                                                           2014  
              

 

                     26679                                                     
     ROUTINE VENIPUNCTURE                                                      
     2014                

 

                     08702                                                     
     CBC                                                           2014  
              

 

                     71495                                                     
     T4 FREE                                                           2014                

 

                     98331                                                     
     T3 TOTAL                                                           2014                

 

                     57957                                                     
     ROUTINE VENIPUNCTURE                                                      
     2014                

 

                     65438                                                     
     IRON SERUM                                                           2014                

 

                     07905                                                     
     IRON BNDNG CAP                                                                           

 

                     71703                                                     
     T4 FREE                                                           2014                

 

                     25260                                                     
     TSH                                                           2014  
              

 

                     08601                                                     
     FERRITIN                                                           2014                

 

                     [id number]                                                   
       IMMATURE PLATELET FRACTION (RESULT ONLY)                                
                           2014                

 

                     [id number]                                                   
       COMPLETE BLOOD COUNT NO  DIFF (CBC Result)                              
                             2014                

 

                     03843                                                     
     DIFFERENTIAL WBC COUNT (CBC DIFF RESULT)                                  
                         2014                

 

                     83187                                                     
     RETICULOCYTE COUNT                                                        
   2014                

 

                     52636                                                     
     PERIPHERAL BLOOD SMEAR W/ PATH REPORT                                     
                      07/10/2014                

 

                     IRGROUP                                                   
       IRON GROUP (Iron,TIBC, Ferritin)                                        
                   07/10/2014                

 

                     10577                                                     
     PERIPHERIAL BLOOD SMEAR                                                   
        07/10/2014                

 

                     [id number]                                                   
       HEMATOLOGY OTHER REPORT                                                 
          07/10/2014                

 

                     32506                                                     
     HEMOCCULT                                                           2014                

 

                     82790                                                     
     HEMOCCULT                                                           2014                



                                                                               
                                                                               
                                                                               
                                                                               
                                                                               
                                                                         



Results

                      





 Test                    Result                    Range                









 RED CELLS LEUKO REDUCED AS1 - 17 13:14                









 RED CELLS LEUKO REDUCED AS1                                   TRANSFUSED      1409                       NRG                









 Blood type T Indirect antibody screen panel - 17 13:14                









 ABO+Rh group                    AP                     NRG                

 

 Transfusion band number                    [id number]                     NRG    
            

 

 Blood group antibody screen                    NEGATIVE                     
NRG                









 Methicillin resistant Staphylococcus aureus (MRSA) screening culture -  13:19                









 Methicillin resistant Staphylococcus aureus (MRSA) screening culture          
          NEG                     NRG                









 Whole blood hemoglobin and hematocrit panel - 17 21:30                









 Venous blood hemoglobin measurement (mass/volume)                    9.6 g/dL 
                   11.5-16.0                

 

 Blood hematocrit (volume fraction)                    32 %                    
35-52                









 Complete blood count (CBC) with automated white blood cell (WBC) differential 
- 17 06:41                









 Blood leukocytes automated count (number/volume)                    7.2 10*3/
uL                    4.3-11.0                

 

 Blood erythrocytes automated count (number/volume)                    4.96 10*6
/uL                    4.35-5.85                

 

 Venous blood hemoglobin measurement (mass/volume)                    10.4 g/dL
                    11.5-16.0                

 

 Blood hematocrit (volume fraction)                    35 %                    
35-52                

 

 Automated erythrocyte mean corpuscular volume                    70 [foz_us]  
                  80-99                

 

 Automated erythrocyte mean corpuscular hemoglobin (mass per erythrocyte)      
              21 pg                    25-34                

 

 Automated erythrocyte mean corpuscular hemoglobin concentration measurement (
mass/volume)                    30 g/dL                    32-36                

 

 Automated erythrocyte distribution width ratio                    23.5 %      
              10.0-14.5                

 

 Automated blood platelet count (count/volume)                    267 10*3/uL  
                  130-400                

 

 Automated blood platelet mean volume measurement                    10.0 [foz_
us]                    7.4-10.4                

 

 Automated blood neutrophils/100 leukocytes                    62 %            
        42-75                

 

 Automated blood lymphocytes/100 leukocytes                    26 %            
        12-44                

 

 Blood monocytes/100 leukocytes                    8 %                    0-12 
               

 

 Automated blood eosinophils/100 leukocytes                    4 %             
       0-10                

 

 Automated blood basophils/100 leukocytes                    1 %               
     0-10                

 

 Blood neutrophils automated count (number/volume)                    4.5 10*3 
                   1.8-7.8                

 

 Blood lymphocytes automated count (number/volume)                    1.9 10*3 
                   1.0-4.0                

 

 Blood monocytes automated count (number/volume)                    0.5 10*3   
                 0.0-1.0                

 

 Automated eosinophil count                    0.3 10*3/uL                    
0.0-0.3                

 

 Automated blood basophil count (count/volume)                    0.1 10*3/uL  
                  0.0-0.1                









 Capillary blood glucose measurement by glucometer (mass/volume) - 17 06:
59                









 Capillary blood glucose measurement by glucometer (mass/volume)               
     147 mg/dL                                    



                                                                               
                                                 



Encounters

                      





 ACCT No.                    Visit Date/Time                    Discharge      
              Status                    Pt. Type                    Provider   
                 Facility                    Loc./Unit                    
Complaint                

 

 [id number]                    2014 09:39:00                    2014 23:
59:59                    [facility]                    Outpatient                    
AYAD IBARRA MD                                                              
                 

 

 [id number]                    2014 13:32:00                    2014 23:
59:59                    [facility]                    Outpatient                    
CHRISTINE AHUJA MD                                                            
                   

 

 [id number]                    2014 12:02:00                    2014 23:
59:59                    [facility]                    Outpatient                    
CHRISTINE AHUJA MD                                                            
                   

 

 [id number]                    2014 09:30:00                    2014 23:
59:59                    [facility]                    Outpatient                    
WINSOME MULLINS DO                                                               
                

 

 [id number]                    2014 08:53:00                    2014 23:
59:59                    [facility]                    Outpatient                    
CHRISTINE AHUJA MD                                                            
                   

 

 [id number]                    2014 13:30:00                    2014 23:
59:59                    [facility]                    Outpatient                    
CHRISTINE AHUJA MD                                                            
                   

 

 [id number]                    2014 08:48:00                    2014 23:
59:59                    [facility]                    Outpatient                    
CHRISTINE AHUJA MD                                                            
                   

 

 [id number]                    2014 09:40:00                    2014 23:
59:59                    [facility]                    Outpatient                    
CHRISTINE AHUJA MD                                                            
                   

 

 [id number]                    2014 09:08:00                    2014 23:
59:59                    [facility]                    Outpatient                    
CHRISTINE AHUJA MD                                                            
                   

 

 [id number]                    2013 15:35:00                    2013 23:
59:59                    [facility]S                    Outpatient                    
WINSOME MULLINS DO                                                               
                

 

 [id number]                    2013 10:32:00                                 
                             Document Registration                             
                                                                       

 

 [id number]                    2013 13:48:00                                 
                             Document Registration                             
                                                                       

 

 [id number]                    08/15/2013 09:58:00                                 
                             Document Registration

## 2017-04-03 ENCOUNTER — HOSPITAL ENCOUNTER (OUTPATIENT)
Dept: HOSPITAL 75 - RAD | Age: 67
End: 2017-04-03
Attending: FAMILY MEDICINE
Payer: MEDICARE

## 2017-04-03 DIAGNOSIS — Z12.31: Primary | ICD-10-CM

## 2017-04-03 PROCEDURE — 77067 SCR MAMMO BI INCL CAD: CPT

## 2017-04-04 NOTE — DIAGNOSTIC IMAGING REPORT
EXAMINATION: Digital Mammogram bilateral screening.



INDICATION: Screening.



COMPARISON: This study was compared to the prior exams of

03/30/2016, 09/06/2013, and 08/24/2012.



At this time, there are no current complaints.



The current study was also evaluated with a Computer Aided

Detection (CAD) system.



FINDINGS: There are scattered fibroglandular densities in both

breasts which could obscure a lesion. Overall, there does not

appear to have been any significant change when compared to the

prior exam. No primary or secondary sign of malignancy is noted.



IMPRESSION: There is no radiographic evidence for malignancy.



ACR BI-RADS Category 1 NEGATIVE.

Result letter will be mailed to the patient.

Note: At least 10% of breast cancer is not imaged by mammography.



Dictated by:



Dictated on workstation # [id number]

## 2017-04-06 ENCOUNTER — HOSPITAL ENCOUNTER (OUTPATIENT)
Dept: HOSPITAL 75 - RAD | Age: 67
End: 2017-04-06
Attending: FAMILY MEDICINE
Payer: MEDICARE

## 2017-04-06 DIAGNOSIS — T81.4XXA: Primary | ICD-10-CM

## 2017-04-06 LAB
ALBUMIN SERPL-MCNC: 4.1 G/DL (ref 3.2–4.5)
ALT SERPL-CCNC: 33 U/L (ref 0–55)
ANION GAP SERPL CALC-SCNC: 10 MMOL/L (ref 5–14)
AST SERPL-CCNC: 40 U/L (ref 5–34)
BASOPHILS # BLD AUTO: 0.1 10^3/UL (ref 0–0.1)
BASOPHILS NFR BLD AUTO: 1 % (ref 0–10)
BILIRUB SERPL-MCNC: 0.7 MG/DL (ref 0.1–1)
BUN SERPL-MCNC: 15 MG/DL (ref 7–18)
BUN/CREAT SERPL: 15
CALCIUM SERPL-MCNC: 9.4 MG/DL (ref 8.5–10.1)
CHLORIDE SERPL-SCNC: 107 MMOL/L (ref 98–107)
CO2 SERPL-SCNC: 25 MMOL/L (ref 21–32)
CREAT SERPL-MCNC: 1.03 MG/DL (ref 0.6–1.3)
EOSINOPHIL # BLD AUTO: 0.4 10^3/UL (ref 0–0.3)
EOSINOPHIL NFR BLD AUTO: 7 % (ref 0–10)
ERYTHROCYTE [DISTWIDTH] IN BLOOD BY AUTOMATED COUNT: 25 % (ref 10–14.5)
GFR SERPLBLD BASED ON 1.73 SQ M-ARVRAT: 53 ML/MIN
GLUCOSE SERPL-MCNC: 129 MG/DL (ref 70–105)
LYMPHOCYTES # BLD AUTO: 1.5 X 10^3 (ref 1–4)
LYMPHOCYTES NFR BLD AUTO: 28 % (ref 12–44)
MCH RBC QN AUTO: 21 PG (ref 25–34)
MCHC RBC AUTO-ENTMCNC: 30 G/DL (ref 32–36)
MCV RBC AUTO: 72 FL (ref 80–99)
MONOCYTES # BLD AUTO: 0.5 X 10^3 (ref 0–1)
MONOCYTES NFR BLD AUTO: 9 % (ref 0–12)
NEUTROPHILS # BLD AUTO: 2.9 X 10^3 (ref 1.8–7.8)
NEUTROPHILS NFR BLD AUTO: 54 % (ref 42–75)
PLATELET # BLD: 317 10^3/UL (ref 130–400)
PMV BLD AUTO: 10.3 FL (ref 7.4–10.4)
POTASSIUM SERPL-SCNC: 4.3 MMOL/L (ref 3.6–5)
PROT SERPL-MCNC: 7 G/DL (ref 6.4–8.2)
RBC # BLD AUTO: 4.67 10^6/UL (ref 4.35–5.85)
SODIUM SERPL-SCNC: 142 MMOL/L (ref 135–145)
WBC # BLD AUTO: 5.4 10^3/UL (ref 4.3–11)

## 2017-04-06 PROCEDURE — 36415 COLL VENOUS BLD VENIPUNCTURE: CPT

## 2017-04-06 PROCEDURE — 85025 COMPLETE CBC W/AUTO DIFF WBC: CPT

## 2017-04-06 PROCEDURE — 80053 COMPREHEN METABOLIC PANEL: CPT

## 2017-04-06 PROCEDURE — 74178 CT ABD&PLV WO CNTR FLWD CNTR: CPT

## 2017-04-06 NOTE — DIAGNOSTIC IMAGING REPORT
PROCEDURE: CT abdomen and pelvis with and without contrast.



TECHNIQUE: Precontrast acquisitions were acquired through the

abdomen and pelvis. Multiple contiguous axial images were

obtained through the abdomen and pelvis after the administration

of intravenous contrast.



INDICATION:  T81.4xxa.



INDICATION: Abdominal pain.



100 mL of Omnipaque 350 is administered intravenously.



FINDINGS:

The lung bases demonstrate no significant abnormality. The liver

demonstrates mild diffuse hepatic steatosis. The gallbladder,

the spleen, the adrenal glands, and the pancreas appear

unremarkable. The kidneys have symmetric enhancement and

contrast excretion. There is a lesion in the lower pole of the

left kidney measuring 2.5 cm compatible with a simple cyst. The

abdominal aorta is normal in caliber. No para-aortic

significantly enlarged lymph node seen. There is a fluid

collection measuring 6.5 x 4.3 x 4.2 cm seen in the anterior

abdominal wall subcutaneous tissues.



There is a small fat-containing umbilical hernia. There is

diastasis of the recti.



The pelvis demonstrate surgical clips in the right adnexa. The

urinary bladder appears unremarkable. Unenhanced phase of the

exam demonstrates no urinary tract stones.



No significant free fluid or fluid collection in the abdomen or

pelvis seen. Changes of hysterectomy noted.



Bone structures demonstrate prominent disc degenerative changes

at L5-S1 level.



IMPRESSION:

1. A 6.5-cm fluid collection in the subcutaneous tissues of the

anterior abdominal wall. This could relate to a seroma.

Correlate clinically.

2. Tiny fat-containing umbilical hernia.



Dictated by:



Dictated on workstation # [id number]

## 2017-06-01 ENCOUNTER — HOSPITAL ENCOUNTER (OUTPATIENT)
Dept: HOSPITAL 75 - SDC | Age: 67
End: 2017-06-01
Attending: FAMILY MEDICINE
Payer: MEDICARE

## 2017-06-01 VITALS — DIASTOLIC BLOOD PRESSURE: 84 MMHG | SYSTOLIC BLOOD PRESSURE: 174 MMHG

## 2017-06-01 VITALS — WEIGHT: 236 LBS | BODY MASS INDEX: 40.29 KG/M2 | HEIGHT: 64 IN

## 2017-06-01 VITALS — SYSTOLIC BLOOD PRESSURE: 174 MMHG | DIASTOLIC BLOOD PRESSURE: 84 MMHG

## 2017-06-01 DIAGNOSIS — D50.9: Primary | ICD-10-CM

## 2017-10-16 ENCOUNTER — HOSPITAL ENCOUNTER (OUTPATIENT)
Dept: HOSPITAL 75 - PREOP | Age: 67
End: 2017-10-16
Attending: SURGERY
Payer: MEDICARE

## 2017-10-16 VITALS — WEIGHT: 229.25 LBS | BODY MASS INDEX: 39.14 KG/M2 | HEIGHT: 64 IN

## 2017-10-16 VITALS — DIASTOLIC BLOOD PRESSURE: 78 MMHG | SYSTOLIC BLOOD PRESSURE: 141 MMHG

## 2017-10-16 DIAGNOSIS — Z01.812: Primary | ICD-10-CM

## 2017-10-16 DIAGNOSIS — E66.01: ICD-10-CM

## 2017-10-16 LAB
BASOPHILS # BLD AUTO: 0 10^3/UL (ref 0–0.1)
BASOPHILS NFR BLD AUTO: 0 % (ref 0–10)
EOSINOPHIL # BLD AUTO: 0.1 10^3/UL (ref 0–0.3)
EOSINOPHIL NFR BLD AUTO: 1 % (ref 0–10)
ERYTHROCYTE [DISTWIDTH] IN BLOOD BY AUTOMATED COUNT: 18.9 % (ref 10–14.5)
LYMPHOCYTES # BLD AUTO: 1.8 X 10^3 (ref 1–4)
LYMPHOCYTES NFR BLD AUTO: 29 % (ref 12–44)
MCH RBC QN AUTO: 21 PG (ref 25–34)
MCHC RBC AUTO-ENTMCNC: 30 G/DL (ref 32–36)
MCV RBC AUTO: 70 FL (ref 80–99)
MONOCYTES # BLD AUTO: 0.5 X 10^3 (ref 0–1)
MONOCYTES NFR BLD AUTO: 8 % (ref 0–12)
NEUTROPHILS # BLD AUTO: 3.8 X 10^3 (ref 1.8–7.8)
NEUTROPHILS NFR BLD AUTO: 61 % (ref 42–75)
PLATELET # BLD: 289 10^3/UL (ref 130–400)
PMV BLD AUTO: 10.5 FL (ref 7.4–10.4)
RBC # BLD AUTO: 4.85 10^6/UL (ref 4.35–5.85)
WBC # BLD AUTO: 6.2 10^3/UL (ref 4.3–11)

## 2017-10-16 PROCEDURE — 85025 COMPLETE CBC W/AUTO DIFF WBC: CPT

## 2017-10-16 PROCEDURE — 87081 CULTURE SCREEN ONLY: CPT

## 2017-10-16 PROCEDURE — 36415 COLL VENOUS BLD VENIPUNCTURE: CPT

## 2017-10-19 ENCOUNTER — HOSPITAL ENCOUNTER (INPATIENT)
Dept: HOSPITAL 75 - 4TH | Age: 67
LOS: 1 days | Discharge: HOME | DRG: 620 | End: 2017-10-20
Attending: SURGERY | Admitting: SURGERY
Payer: MEDICARE

## 2017-10-19 VITALS — SYSTOLIC BLOOD PRESSURE: 160 MMHG | DIASTOLIC BLOOD PRESSURE: 77 MMHG

## 2017-10-19 VITALS — BODY MASS INDEX: 39.14 KG/M2 | HEIGHT: 64 IN | WEIGHT: 229.25 LBS

## 2017-10-19 VITALS — SYSTOLIC BLOOD PRESSURE: 136 MMHG | DIASTOLIC BLOOD PRESSURE: 63 MMHG

## 2017-10-19 VITALS — DIASTOLIC BLOOD PRESSURE: 85 MMHG | SYSTOLIC BLOOD PRESSURE: 179 MMHG

## 2017-10-19 VITALS — SYSTOLIC BLOOD PRESSURE: 177 MMHG | DIASTOLIC BLOOD PRESSURE: 79 MMHG

## 2017-10-19 DIAGNOSIS — J95.811: ICD-10-CM

## 2017-10-19 DIAGNOSIS — E66.01: Primary | ICD-10-CM

## 2017-10-19 DIAGNOSIS — E11.9: ICD-10-CM

## 2017-10-19 DIAGNOSIS — I10: ICD-10-CM

## 2017-10-19 DIAGNOSIS — K58.9: ICD-10-CM

## 2017-10-19 DIAGNOSIS — K21.9: ICD-10-CM

## 2017-10-19 DIAGNOSIS — G47.33: ICD-10-CM

## 2017-10-19 DIAGNOSIS — M17.0: ICD-10-CM

## 2017-10-19 DIAGNOSIS — M79.7: ICD-10-CM

## 2017-10-19 DIAGNOSIS — Z85.850: ICD-10-CM

## 2017-10-19 DIAGNOSIS — M16.0: ICD-10-CM

## 2017-10-19 DIAGNOSIS — E78.00: ICD-10-CM

## 2017-10-19 PROCEDURE — 80048 BASIC METABOLIC PNL TOTAL CA: CPT

## 2017-10-19 PROCEDURE — 36415 COLL VENOUS BLD VENIPUNCTURE: CPT

## 2017-10-19 PROCEDURE — 0W9B30Z DRAINAGE OF LEFT PLEURAL CAVITY WITH DRAINAGE DEVICE, PERCUTANEOUS APPROACH: ICD-10-PCS | Performed by: SURGERY

## 2017-10-19 PROCEDURE — 0DB64Z3 EXCISION OF STOMACH, PERCUTANEOUS ENDOSCOPIC APPROACH, VERTICAL: ICD-10-PCS | Performed by: SURGERY

## 2017-10-19 PROCEDURE — 71010: CPT

## 2017-10-19 PROCEDURE — 94664 DEMO&/EVAL PT USE INHALER: CPT

## 2017-10-19 PROCEDURE — 94760 N-INVAS EAR/PLS OXIMETRY 1: CPT

## 2017-10-19 PROCEDURE — 82962 GLUCOSE BLOOD TEST: CPT

## 2017-10-19 PROCEDURE — 94640 AIRWAY INHALATION TREATMENT: CPT

## 2017-10-19 PROCEDURE — 85027 COMPLETE CBC AUTOMATED: CPT

## 2017-10-19 RX ADMIN — CEFAZOLIN SCH MLS/HR: 10 INJECTION, POWDER, FOR SOLUTION INTRAVENOUS at 18:50

## 2017-10-19 RX ADMIN — ONDANSETRON PRN MG: 2 INJECTION, SOLUTION INTRAMUSCULAR; INTRAVENOUS at 13:30

## 2017-10-19 RX ADMIN — HYDROMORPHONE HYDROCHLORIDE PRN MG: 2 INJECTION, SOLUTION INTRAMUSCULAR; INTRAVENOUS; SUBCUTANEOUS at 16:17

## 2017-10-19 RX ADMIN — SODIUM CHLORIDE, SODIUM LACTATE, POTASSIUM CHLORIDE, AND CALCIUM CHLORIDE PRN MLS/HR: 600; 310; 30; 20 INJECTION, SOLUTION INTRAVENOUS at 14:05

## 2017-10-19 RX ADMIN — ALBUTEROL SULFATE SCH MG: 2.5 SOLUTION RESPIRATORY (INHALATION) at 22:52

## 2017-10-19 RX ADMIN — METOPROLOL TARTRATE SCH MG: 1 INJECTION, SOLUTION INTRAVENOUS at 20:12

## 2017-10-19 RX ADMIN — METOCLOPRAMIDE SCH MG: 5 INJECTION, SOLUTION INTRAMUSCULAR; INTRAVENOUS at 23:25

## 2017-10-19 RX ADMIN — ALBUTEROL SULFATE SCH MG: 2.5 SOLUTION RESPIRATORY (INHALATION) at 19:43

## 2017-10-19 RX ADMIN — ONDANSETRON SCH MG: 2 INJECTION, SOLUTION INTRAMUSCULAR; INTRAVENOUS at 23:26

## 2017-10-19 RX ADMIN — Medication PRN MCG: at 15:31

## 2017-10-19 RX ADMIN — METRONIDAZOLE SCH MLS/HR: 5 INJECTION, SOLUTION INTRAVENOUS at 23:26

## 2017-10-19 RX ADMIN — MORPHINE SULFATE PRN MG: 10 INJECTION, SOLUTION INTRAMUSCULAR; INTRAVENOUS at 13:19

## 2017-10-19 RX ADMIN — MORPHINE SULFATE PRN MG: 10 INJECTION, SOLUTION INTRAMUSCULAR; INTRAVENOUS at 13:12

## 2017-10-19 RX ADMIN — SODIUM CHLORIDE, SODIUM LACTATE, POTASSIUM CHLORIDE, AND CALCIUM CHLORIDE PRN MLS/HR: 600; 310; 30; 20 INJECTION, SOLUTION INTRAVENOUS at 09:48

## 2017-10-19 RX ADMIN — MEPERIDINE HYDROCHLORIDE PRN MG: 50 INJECTION INTRAMUSCULAR; INTRAVENOUS; SUBCUTANEOUS at 13:20

## 2017-10-19 RX ADMIN — ENOXAPARIN SODIUM SCH MG: 30 INJECTION SUBCUTANEOUS at 20:12

## 2017-10-19 RX ADMIN — SODIUM CHLORIDE SCH MLS/HR: 900 INJECTION, SOLUTION INTRAVENOUS at 15:45

## 2017-10-19 RX ADMIN — HYDROMORPHONE HYDROCHLORIDE PRN MG: 2 INJECTION, SOLUTION INTRAMUSCULAR; INTRAVENOUS; SUBCUTANEOUS at 14:00

## 2017-10-19 RX ADMIN — METRONIDAZOLE SCH MLS/HR: 5 INJECTION, SOLUTION INTRAVENOUS at 17:54

## 2017-10-19 RX ADMIN — METOPROLOL TARTRATE SCH MG: 1 INJECTION, SOLUTION INTRAVENOUS at 23:24

## 2017-10-19 RX ADMIN — ONDANSETRON PRN MG: 2 INJECTION, SOLUTION INTRAMUSCULAR; INTRAVENOUS at 16:17

## 2017-10-19 RX ADMIN — CEFAZOLIN SCH MLS/HR: 10 INJECTION, POWDER, FOR SOLUTION INTRAVENOUS at 22:11

## 2017-10-19 RX ADMIN — HYDROMORPHONE HYDROCHLORIDE PRN MG: 2 INJECTION, SOLUTION INTRAMUSCULAR; INTRAVENOUS; SUBCUTANEOUS at 13:55

## 2017-10-19 RX ADMIN — MEPERIDINE HYDROCHLORIDE PRN MG: 50 INJECTION INTRAMUSCULAR; INTRAVENOUS; SUBCUTANEOUS at 13:30

## 2017-10-19 RX ADMIN — HYDROMORPHONE HYDROCHLORIDE PRN MG: 2 INJECTION, SOLUTION INTRAMUSCULAR; INTRAVENOUS; SUBCUTANEOUS at 14:05

## 2017-10-19 RX ADMIN — SODIUM CHLORIDE, SODIUM LACTATE, POTASSIUM CHLORIDE, AND CALCIUM CHLORIDE PRN MLS/HR: 600; 310; 30; 20 INJECTION, SOLUTION INTRAVENOUS at 10:50

## 2017-10-19 NOTE — PROGRESS NOTE-POST OPERATIVE
Post-Operative Progess Note


Surgeon (s)/Assistant (s)


Surgeon


HERRERA SESAY MD


Assistant:  farzad grey APRN





Pre-Operative Diagnosis


morbid obesity, diabetes





Post-Operative Diagnosis





same





Procedure & Operative Findings


Date of Procedure


10/19/17


Procedure Performed/Findings


laparoscopic gastric sleeve resection


Anesthesia Type


GET





Estimated Blood Loss


Estimated blood loss (mL):  minimal





Specimens/Packing


Specimens Removed


stomach











HERRERA SESAY MD Oct 19, 2017 12:44 pm

## 2017-10-19 NOTE — DIAGNOSTIC IMAGING REPORT
EXAMINATION:

Portable upright radiograph of the chest.



INDICATION: 

Left pneumothorax.



FINDINGS:

A small bore left chest tube is placed and is seen at the lateral

aspect of the mid to lower left lung zone. There is expansion of

the left lung with very minimal remaining left apical

pneumothorax. The right lung is clear. The heart size is

borderline. No effusion or pneumothorax.



IMPRESSION: 

Small bore left chest tube is placed with a remaining tiny left

apical pneumothorax.



Dictated by: 



  Dictated on workstation # [id number]

## 2017-10-19 NOTE — PROGRESS NOTE-POST OPERATIVE
Post-Operative Progess Note


Surgeon (s)/Assistant (s)


Surgeon


HERRERA SESAY MD


Assistant:  none





Pre-Operative Diagnosis


left pneumothorax





Post-Operative Diagnosis





same





Procedure & Operative Findings


Date of Procedure


10/19/17


Procedure Performed/Findings


placement left chest tube


Anesthesia Type


local





Estimated Blood Loss


Estimated blood loss (mL):  minimal





Specimens/Packing


Specimens Removed


none











HERRERA SESAY MD Oct 19, 2017 15:59

## 2017-10-19 NOTE — OPERATIVE REPORT
DATE OF SERVICE:  10/19/2017



ATTENDING PHYSICIAN:

Dr. Sin.



PREOPERATIVE DIAGNOSIS:

Left pneumothorax.



POSTOPERATIVE DIAGNOSIS:

Left pneumothorax.



PROCEDURE:

Placement of left chest tube.



SURGEON:

Dr. Sesay.



ANESTHESIA:

Local.



ESTIMATED BLOOD LOSS:

Minimal.



FINDINGS:

A 40% pneumothorax post procedure most likely secondary to extensive dissection

from her previous bariatric surgery and the left subphrenic space.



DISPOSITION:

The patient tolerated the procedure well.



The patient is a 67-year-old female with multiple medical comorbidities.  She

has hypertension, hypercholesterolemia, sleep apnea as well as diabetes.  She

underwent a previous laparoscopic bariatric surgical procedure including the

laparoscopic gastric band in 2006; however, was unsuccessful with band slippage

and have the band removed.  She continues to be obese and have met the criteria

for bariatric surgery.  She underwent a laparoscopic gastric sleeve resection. 

There was extensive tissue in the left subphrenic space from her previous

surgery.  Due to this dissection most likely she had developed a left chest

discomfort after extubation in the recovery.  A chest x-ray was performed, which

did show a left pneumothorax approximately 40% in size.



Left chest was prepped and draped in standard surgical fashion.  A 1% lidocaine

was used to anesthetize the skin, subcutaneous tissue, muscle layers as well as

the parietal pleura.  Small transverse skin incision made using a 11 blade.  A

small pigtail catheter was then inserted into the pleural space over the trocar

without any resistance.  The catheter was then placed onto tubing as well and a

Pleur-evac.  Air was evacuated under suction and no air leak identified.  This

was then fixed to the skin using large Op-Site as well as foam tape.



The patient tolerated the procedure well.  A post-procedure chest x-ray was

performed which showed only a very small residual pneumothorax.  We will get a

chest x-ray in the morning and if there is no pneumothorax remove the chest

tube.





Job ID: [id number]

DocumentID: [id number]

Dictated Date:  10/19/2017 16:03:44

Transcription Date: 10/19/2017 23:23:20

Dictated By: HERRERA SESAY MD

[facility]

## 2017-10-19 NOTE — DISCHARGE INST-SURGICAL
D/C Lap Instructions-LÁZARO


New, Converted, or Re-Newed RX:  RX on Chart


Follow Up Appt in 2 weeks





Activity as tolerated


No driving for 24 hours


No driving while on pain medications





Incentive Spirometry use every 2 hours while awake





phase 1 clear liquid diet 2 weeks.





Symptoms to Report: Fever over 101 degree F, Nausea/Vomiting 


Infection Signs and Symptoms to report:  Increased redness, Foul odor of wound, 

Increased drainage





Bathing instructions: May shower


Operative Area Clean/Dry;  Keep incision clean/dry





If any problems/questions: Contact your physician or go to Emergency Room











HERRERA SESAY MD Oct 19, 2017 10:11 am

## 2017-10-19 NOTE — DIAGNOSTIC IMAGING REPORT
Upright portable radiograph of the chest.



INDICATION: Shortness of breath.



FINDINGS: There is a 40% pneumothorax in the left lung. The right

lung is clear. The heart size is the upper limits of normal. No

right pleural effusion is evident. Minimal left basilar

atelectasis and tiny left effusion is suggested.



IMPRESSION: There is a 40% left pneumothorax. 



The findings were called to Dr. Serna at the time of dictation



Dictated by: 



  Dictated on workstation # [id number]

## 2017-10-19 NOTE — PROGRESS NOTE-PRE OPERATIVE
Pre-Operative Progress Note


H&P Reviewed


The H&P was reviewed, patient examined and no changes noted.


Date Seen by Provider:  Oct 19, 2017


Time Seen by Provider:  09:00


Date H&P Reviewed:  Oct 19, 2017


Time H&P Reviewed:  09:00


Pre-Operative Diagnosis:  morbid obesity, diabetes











HERRERA SESAY MD Oct 19, 2017 9:52 am

## 2017-10-19 NOTE — OPERATIVE REPORT
DATE OF SERVICE:  10/19/2017



ATTENDING PRIMARY CARE PHYSICIAN:

Dr. Federica Sin.



PREOPERATIVE DIAGNOSES:

Morbid obesity, hypertension, hypercholesterolemia, diabetes, obstructive sleep

apnea.



POSTOPERATIVE DIAGNOSES:

Morbid obesity, hypertension, hypercholesterolemia, diabetes, obstructive sleep

apnea.



PROCEDURE:

Laparoscopic gastric sleeve resection.



SURGEON:

Herrera Sesay MD.



ANESTHESIA:

General endotracheal.



ESTIMATED BLOOD LOSS:

Minimal.



FINDINGS:

Mild-to-moderate scar tissue from previous laparoscopic adjustable gastric band

placement.  No hiatal hernia was identified.



DISPOSITION:

The patient tolerated the procedure well.



INDICATIONS FOR PROCEDURE:

The patient is a 67-year-old female known to us.  She is interested in the

laparoscopic gastric sleeve resection and meets the medical criteria for

bariatric surgery.  She did have a laparoscopic adjustable gastric band placed

July 2006 in [place].  She had seen us for followups due to proximity and

adjustments; however, she did have continued issues with reflux and

regurgitation despite having fluid removed from the band.  Medical management

was tried as well as Nexium, Protonix and Carafate, but she was continued to be

symptomatic.  She did have an anterior slippage identified and the band removed

in March 2017.



She did gain the majority of her adult weight around 2005 after a job change. 

She has tried a number of diet and exercise attempts as well as medications with

no success.  She has tried diet programs including high-protein diet,

low-calorie/low-carbohydrate diets with no success.  She has tried exercise

regimens including aerobic exercise classes, aquatic exercise classes, weight

training classes, stationary bike, treadmill, with again no success.  She has

tried numerous medications including fenfluramine as well as phentermine and did

have some success; however, regained the weight back as well as more over time. 

Her medical comorbidities related to her obesity include hypertension,

hypercholesterolemia, diabetes, degenerative joint disease, peripheral

neuropathy, obstructive sleep apnea, gastroesophageal reflux disease.



DESCRIPTION OF PROCEDURE:

The patient was brought to the operating room, laid supine on the table.  After

adequate IV pain and sedative medications and general endotracheal intubation,

the abdomen was prepped and draped in a standard surgical fashion.



A 0.5% Marcaine with epinephrine was then used to anesthetize the overlying skin

in the left upper abdominal quadrant and a small transverse skin incision made

using a 15 blade.  A 0 silk suture was applied to the medial aspect of the

incision for retraction and a Veress needle inserted with a low opening pressure

of 0 mmHg.  The abdomen was insufflated to 15 mmHg pressure.  The Veress needle

removed and a 5 mm Xcel trocar placed followed by a 5 mm 45-degree angle

laparoscope visualizing the peritoneal cavity.



A 4-quadrant abdominal exploration was performed.  There was mild-to-moderate

adhesion tissue from previous laparoscopic gastric sleeve resection.  This was

more focused towards the angle of His and the upper stomach.  The gallbladder

was identified and appeared to be normal.  There was mild liver steatosis. 

Under direct visualization, we then placed a midabdominal left of midline 10 mm

port after the skin and peritoneum were anesthetized using 0.5% Marcaine with

epinephrine and a transverse skin incision made using a 15 blade.  In a similar

manner, a midabdominal right of midline 15 mm port was placed followed by a

right upper abdominal quadrant 5 mm port.  An area in the epigastric region was

then anesthetized and a small transverse skin incision made using an 11 blade. 

A tract was then created through the abdominal wall layers using a trocar to a 5

mm port and through this opening, a medium-sized Emery liver retractor was

placed and the left lobe of the liver retracted anteriorly and superiorly.



The patient was then placed in steep reverse Trendelenburg position.  We then

measured the distance from the pylorus along the greater curvature approximately

6 cm and marked this with a marking pen.  In this region, we then opened the

gastrocolic ligament next to the stomach entering the lesser sac.  We then

proceeded with inferior dissection until we were approximately 2 mm below our

marking using the Sonicision with visualization of good hemostasis.  We then

proceeded superiorly, taking down the short gastric vessels using the

Sonicision.  The entire angle of His connective tissue fibers as well as the

previous scar tissue from her previous surgery were then taken down gently using

Sonicision with visualization of good hemostasis.  There was no hiatal hernia

identified.



A 42-H[place]er bougie was then placed under direct visualization and the tip

directed into the pylorus.  We then proceeded with our gastric sleeve resection

starting approximately 2 cm below our marking with a 45 mm JASMINA polyglycolic acid

black load stapler.  We then proceeded using the bougie as our guide proximally,

completing our gastric sleeve resection with 60 mm black reloads.  Good

hemostasis was observed.  The corners were then bolstered with 5 mm clips. 

Tisseel fibrin glue was then placed onto the staple line and the omentum placed

over the staple line.



The liver retractor was then removed and the stomach was removed through the 15

mm port site.  The fascia and peritoneum to the 15 and 10 mm port sites were

then closed under direct visualization using a Lj-Yvonne device and 0

Vicryl suture.  The abdomen was desufflated and the remaining ports were

removed.  All skin incisions were closed using 4-0 Monocryl running subcuticular

sutures.  Wounds were then cleaned and covered with Dermabond.



The patient tolerated the procedure well.  We will start pain control with

fentanyl PCA as well as DVT prophylaxis with early ambulation, calf SCDs as well

as Lovenox injections.  Tomorrow morning, we will start the patient on clear

liquid diet.  Once she is tolerating approximately 60 mL of clear liquids every

half hour, has good pain control with oral pain medication and is ambulating

well, we will discharge her home.





Job ID: [id number]

DocumentID: [id number]

Dictated Date:  10/19/2017 13:05:04

Transcription Date: 10/19/2017 19:17:21

Dictated By: HERRERA SESAY MD

## 2017-10-20 VITALS — SYSTOLIC BLOOD PRESSURE: 136 MMHG | DIASTOLIC BLOOD PRESSURE: 77 MMHG

## 2017-10-20 VITALS — DIASTOLIC BLOOD PRESSURE: 63 MMHG | SYSTOLIC BLOOD PRESSURE: 142 MMHG

## 2017-10-20 VITALS — SYSTOLIC BLOOD PRESSURE: 149 MMHG | DIASTOLIC BLOOD PRESSURE: 75 MMHG

## 2017-10-20 VITALS — DIASTOLIC BLOOD PRESSURE: 75 MMHG | SYSTOLIC BLOOD PRESSURE: 139 MMHG

## 2017-10-20 VITALS — DIASTOLIC BLOOD PRESSURE: 78 MMHG | SYSTOLIC BLOOD PRESSURE: 155 MMHG

## 2017-10-20 LAB
ANION GAP SERPL CALC-SCNC: 15 MMOL/L (ref 5–14)
BUN SERPL-MCNC: 9 MG/DL (ref 7–18)
BUN/CREAT SERPL: 9
CALCIUM SERPL-MCNC: 8.4 MG/DL (ref 8.5–10.1)
CHLORIDE SERPL-SCNC: 103 MMOL/L (ref 98–107)
CO2 SERPL-SCNC: 21 MMOL/L (ref 21–32)
CREAT SERPL-MCNC: 0.97 MG/DL (ref 0.6–1.3)
ERYTHROCYTE [DISTWIDTH] IN BLOOD BY AUTOMATED COUNT: 18.7 % (ref 10–14.5)
GFR SERPLBLD BASED ON 1.73 SQ M-ARVRAT: 57 ML/MIN
GLUCOSE SERPL-MCNC: 136 MG/DL (ref 70–105)
MCH RBC QN AUTO: 21 PG (ref 25–34)
MCHC RBC AUTO-ENTMCNC: 29 G/DL (ref 32–36)
MCV RBC AUTO: 71 FL (ref 80–99)
PLATELET # BLD: 229 10^3/UL (ref 130–400)
PMV BLD AUTO: 10.1 FL (ref 7.4–10.4)
POTASSIUM SERPL-SCNC: 2.7 MMOL/L (ref 3.6–5)
RBC # BLD AUTO: 4.2 10^6/UL (ref 4.35–5.85)
SODIUM SERPL-SCNC: 139 MMOL/L (ref 135–145)
WBC # BLD AUTO: 7.2 10^3/UL (ref 4.3–11)

## 2017-10-20 RX ADMIN — POTASSIUM CHLORIDE SCH MLS/HR: 200 INJECTION, SOLUTION INTRAVENOUS at 12:44

## 2017-10-20 RX ADMIN — ENOXAPARIN SODIUM SCH MG: 30 INJECTION SUBCUTANEOUS at 08:39

## 2017-10-20 RX ADMIN — CEFAZOLIN SCH MLS/HR: 10 INJECTION, POWDER, FOR SOLUTION INTRAVENOUS at 05:36

## 2017-10-20 RX ADMIN — ALBUTEROL SULFATE SCH MG: 2.5 SOLUTION RESPIRATORY (INHALATION) at 18:37

## 2017-10-20 RX ADMIN — ALBUTEROL SULFATE SCH MG: 2.5 SOLUTION RESPIRATORY (INHALATION) at 14:07

## 2017-10-20 RX ADMIN — Medication PRN MCG: at 06:22

## 2017-10-20 RX ADMIN — SODIUM CHLORIDE SCH MLS/HR: 900 INJECTION, SOLUTION INTRAVENOUS at 14:53

## 2017-10-20 RX ADMIN — METOPROLOL TARTRATE SCH MG: 1 INJECTION, SOLUTION INTRAVENOUS at 13:29

## 2017-10-20 RX ADMIN — SODIUM CHLORIDE SCH MLS/HR: 900 INJECTION, SOLUTION INTRAVENOUS at 08:39

## 2017-10-20 RX ADMIN — POTASSIUM CHLORIDE SCH MLS/HR: 200 INJECTION, SOLUTION INTRAVENOUS at 14:55

## 2017-10-20 RX ADMIN — POTASSIUM CHLORIDE SCH MLS/HR: 200 INJECTION, SOLUTION INTRAVENOUS at 11:33

## 2017-10-20 RX ADMIN — ALBUTEROL SULFATE SCH MG: 2.5 SOLUTION RESPIRATORY (INHALATION) at 06:36

## 2017-10-20 RX ADMIN — SODIUM CHLORIDE SCH MLS/HR: 900 INJECTION, SOLUTION INTRAVENOUS at 04:19

## 2017-10-20 RX ADMIN — METRONIDAZOLE SCH MLS/HR: 5 INJECTION, SOLUTION INTRAVENOUS at 06:23

## 2017-10-20 RX ADMIN — METOCLOPRAMIDE SCH MG: 5 INJECTION, SOLUTION INTRAMUSCULAR; INTRAVENOUS at 05:36

## 2017-10-20 RX ADMIN — POTASSIUM CHLORIDE SCH MLS/HR: 200 INJECTION, SOLUTION INTRAVENOUS at 13:46

## 2017-10-20 RX ADMIN — METOPROLOL TARTRATE SCH MG: 1 INJECTION, SOLUTION INTRAVENOUS at 04:19

## 2017-10-20 RX ADMIN — ALBUTEROL SULFATE SCH MG: 2.5 SOLUTION RESPIRATORY (INHALATION) at 10:06

## 2017-10-20 RX ADMIN — ALBUTEROL SULFATE SCH MG: 2.5 SOLUTION RESPIRATORY (INHALATION) at 02:30

## 2017-10-20 RX ADMIN — METOPROLOL TARTRATE SCH MG: 1 INJECTION, SOLUTION INTRAVENOUS at 17:46

## 2017-10-20 RX ADMIN — METOPROLOL TARTRATE SCH MG: 1 INJECTION, SOLUTION INTRAVENOUS at 08:38

## 2017-10-20 RX ADMIN — ONDANSETRON SCH MG: 2 INJECTION, SOLUTION INTRAMUSCULAR; INTRAVENOUS at 05:36

## 2017-10-20 NOTE — ANESTHESIA-GENERAL POST-OP
General


Patient Condition


Mental Status/LOC:  Same as Preop


Cardiovascular:  Satisfactory


Nausea/Vomiting:  Absent


Respiratory:  Satisfactory


Pain:  Controlled


Complications:  Absent





Post Op Complications


Complications


None





Follow Up Care/Instructions


Patient Instructions


None needed.





Anesthesia/Patient Condition


Patient Condition


Patient is doing well, no complaints, stable vital signs, no apparent adverse 

anesthesia problems.   


No complications reported per nursing.











GUI GARIBAY CRNA Oct 20, 2017 09:10

## 2017-10-20 NOTE — PROGRESS NOTE (SOAP)
Subjective


Date Seen by Provider:  Oct 20, 2017


Time Seen by Provider:  10:00


Subjective/Events-last exam


doing well. no SOB, no nausea. pain controlled. CXR normal





Objective


Exam





Vital Signs








  Date Time  Temp Pulse Resp B/P (MAP) Pulse Ox O2 Delivery O2 Flow Rate FiO2


 


10/20/17 10:07     94 Room Air  


 


10/20/17 09:00     94 Room Air  


 


10/20/17 08:00 99.6 98 24 149/75 90 Room Air  


 


10/20/17 06:38     92 Room Air  


 


10/20/17 06:22   20     


 


10/20/17 05:39   20     


 


10/20/17 04:00 98.7 77 20 139/75 92 Room Air  


 


10/20/17 02:30     91 Room Air  


 


10/20/17 01:00  91      


 


10/20/17 00:40 100.3 86 18 155/78 96 Room Air  


 


10/19/17 22:53     92 Room Air  


 


10/19/17 21:00      Room Air  


 


10/19/17 20:17  78      


 


10/19/17 19:40 97.8 89 18 136/63 96 Room Air  


 


10/19/17 15:30 97.3 86 20 160/77 97 Room Air  


 


10/19/17 14:51 97.1 85 18 177/79 100 Room Air  





Capillary Refill :


General Appearance:  No Apparent Distress


HEENT:  PERRL/EOMI, TMs Normal


Neck:  Full Range of Motion


Respiratory:  Chest Non Tender, Lungs Clear, Normal Breath Sounds


Cardiovascular:  Regular Rate, Rhythm


Gastrointestinal:  normal bowel sounds, soft


Extremity:  Normal Capillary Refill


Neurologic/Psychiatric:  Alert, Oriented x3


Skin:  Normal Color


Lymphatic:  No Adenopathy





Results


Lab


Laboratory Tests


10/19/17 16:22: Glucometer 174H


10/19/17 19:43: Glucometer 108


10/20/17 00:39: Glucometer 102


10/20/17 04:19: Glucometer 128H


10/20/17 06:03: 


White Blood Count 7.2, Red Blood Count 4.20L, Hemoglobin 8.7L, Hematocrit 30L, 

Mean Corpuscular Volume 71L, Mean Corpuscular Hemoglobin 21L, Mean Corpuscular 

Hemoglobin Concent 29L, Red Cell Distribution Width 18.7H, Platelet Count 229, 

Mean Platelet Volume 10.1, Sodium Level 139, Potassium Level 2.7L, Chloride 

Level 103, Carbon Dioxide Level 21, Anion Gap 15H, Blood Urea Nitrogen 9, 

Creatinine 0.97, Estimat Glomerular Filtration Rate 57, BUN/Creatinine Ratio 9, 

Glucose Level 136H, Calcium Level 8.4L


10/20/17 09:24: Glucometer 161H





Assessment/Plan


Assessment/Plan


Assess & Plan/Chief Complaint


s/p lap sleeve and CT.


no PTX. will remove CT.


ambulate.


phase 1 clear liquids.


home soon.





Clinical Quality Measures


DVT/VTE Risk/Contraindication:


Risk Factor Score Per Nursin


RFS Level Per Nursing on Admit:  4+=Very High











HERRERA SESAY MD Oct 20, 2017 11:14 am

## 2017-10-20 NOTE — DIAGNOSTIC IMAGING REPORT
INDICATION: 

Followup chest tube removal.



COMPARISON:  

10/20/2017 at 5 AM.



FINDINGS: 

A left basilar effusion is present. No evidence of pneumothorax.

The left upper lung is clear. The right lung is well-aerated and

clear.



IMPRESSION: 

Small left pleural effusion with no evidence of pneumothorax.



Dictated by: 



  Dictated on workstation # [id number]

## 2017-10-20 NOTE — DIAGNOSTIC IMAGING REPORT
INDICATION: Left pneumothorax.



EXAM: Portable chest at 5:02 AM



COMPARISON STUDY: Previous day.



FINDINGS:  

There is no pneumothorax on today's exam. The heart size and

pulmonary vascularity are  normal. The lungs are clear.



IMPRESSION:

Interval resolution of the left pneumothorax.



Dictated by: 



  Dictated on workstation # [id number]

## 2017-12-22 VITALS — SYSTOLIC BLOOD PRESSURE: 140 MMHG | DIASTOLIC BLOOD PRESSURE: 67 MMHG

## 2017-12-22 RX ADMIN — ACETAMINOPHEN SCH MG: 325 TABLET ORAL at 13:18

## 2017-12-22 RX ADMIN — DIPHENHYDRAMINE HYDROCHLORIDE SCH MG: 50 INJECTION INTRAMUSCULAR; INTRAVENOUS at 13:21

## 2017-12-26 VITALS — DIASTOLIC BLOOD PRESSURE: 69 MMHG | SYSTOLIC BLOOD PRESSURE: 141 MMHG

## 2017-12-26 RX ADMIN — SODIUM CHLORIDE SCH MLS/HR: 900 INJECTION, SOLUTION INTRAVENOUS at 13:12

## 2017-12-26 RX ADMIN — DIPHENHYDRAMINE HYDROCHLORIDE SCH MG: 50 INJECTION INTRAMUSCULAR; INTRAVENOUS at 13:05

## 2017-12-26 RX ADMIN — ACETAMINOPHEN SCH MG: 325 TABLET ORAL at 13:01

## 2017-12-29 VITALS — SYSTOLIC BLOOD PRESSURE: 138 MMHG | DIASTOLIC BLOOD PRESSURE: 67 MMHG

## 2017-12-29 RX ADMIN — DIPHENHYDRAMINE HYDROCHLORIDE SCH MG: 50 INJECTION INTRAMUSCULAR; INTRAVENOUS at 13:29

## 2017-12-29 RX ADMIN — ACETAMINOPHEN SCH MG: 325 TABLET ORAL at 13:20

## 2017-12-29 RX ADMIN — SODIUM CHLORIDE SCH MLS/HR: 900 INJECTION, SOLUTION INTRAVENOUS at 13:30

## 2018-01-02 VITALS — DIASTOLIC BLOOD PRESSURE: 71 MMHG | SYSTOLIC BLOOD PRESSURE: 131 MMHG

## 2018-01-02 VITALS — SYSTOLIC BLOOD PRESSURE: 131 MMHG | DIASTOLIC BLOOD PRESSURE: 71 MMHG

## 2018-01-02 RX ADMIN — SODIUM CHLORIDE SCH MLS/HR: 900 INJECTION, SOLUTION INTRAVENOUS at 13:33

## 2018-01-04 VITALS — DIASTOLIC BLOOD PRESSURE: 70 MMHG | SYSTOLIC BLOOD PRESSURE: 137 MMHG

## 2018-01-04 RX ADMIN — SODIUM CHLORIDE SCH MLS/HR: 900 INJECTION, SOLUTION INTRAVENOUS at 13:29

## 2018-01-08 ENCOUNTER — HOSPITAL ENCOUNTER (OUTPATIENT)
Dept: HOSPITAL 75 - SDC | Age: 68
LOS: 73 days | Discharge: HOME | End: 2018-03-22
Attending: FAMILY MEDICINE
Payer: MEDICARE

## 2018-01-08 VITALS
SYSTOLIC BLOOD PRESSURE: 141 MMHG | BODY MASS INDEX: 39.14 KG/M2 | WEIGHT: 229.25 LBS | DIASTOLIC BLOOD PRESSURE: 65 MMHG | HEIGHT: 64 IN

## 2018-01-08 DIAGNOSIS — D50.9: Primary | ICD-10-CM

## 2018-01-08 PROCEDURE — 96365 THER/PROPH/DIAG IV INF INIT: CPT

## 2018-01-08 PROCEDURE — 96375 TX/PRO/DX INJ NEW DRUG ADDON: CPT

## 2018-01-08 RX ADMIN — ACETAMINOPHEN SCH MG: 325 TABLET ORAL at 13:11

## 2018-01-08 RX ADMIN — SODIUM CHLORIDE SCH MLS/HR: 900 INJECTION, SOLUTION INTRAVENOUS at 13:27

## 2018-03-12 ENCOUNTER — HOSPITAL ENCOUNTER (OUTPATIENT)
Dept: HOSPITAL 75 - SLEEP | Age: 68
Discharge: HOME | End: 2018-03-12
Attending: NURSE PRACTITIONER
Payer: MEDICARE

## 2018-03-12 DIAGNOSIS — R06.83: ICD-10-CM

## 2018-03-12 DIAGNOSIS — G47.10: Primary | ICD-10-CM

## 2018-04-11 ENCOUNTER — HOSPITAL ENCOUNTER (OUTPATIENT)
Dept: HOSPITAL 75 - RAD | Age: 68
End: 2018-04-11
Attending: NURSE PRACTITIONER
Payer: MEDICARE

## 2018-04-11 DIAGNOSIS — Z12.31: Primary | ICD-10-CM

## 2018-04-11 PROCEDURE — 77067 SCR MAMMO BI INCL CAD: CPT

## 2018-04-11 NOTE — DIAGNOSTIC IMAGING REPORT
INDICATION: Routine screening.



Comparison is made with prior exams from 04/03/2017 and

03/30/2016.



The current study was also evaluated with a Computer Aided

Detection (CAD) system.



FINDINGS: Scattered fibroglandular densities are identified

bilaterally. There are benign calcifications bilaterally. No mass

or malignant-appearing microcalcifications are seen. The axillae

are unremarkable.



IMPRESSION: No mammographic features suspicious for malignancy

are identified.



ACR BI-RADS Category 2: Benign findings.

Result letter will be mailed to the patient.

Note: At least 10% of breast cancer is not imaged by mammography.



Dictated by: 



  Dictated on workstation # [id number]

## 2018-05-10 ENCOUNTER — HOSPITAL ENCOUNTER (OUTPATIENT)
Dept: HOSPITAL 75 - RAD | Age: 68
End: 2018-05-10
Attending: NURSE PRACTITIONER
Payer: MEDICARE

## 2018-05-10 DIAGNOSIS — R05: Primary | ICD-10-CM

## 2018-05-10 PROCEDURE — 71046 X-RAY EXAM CHEST 2 VIEWS: CPT

## 2018-05-10 NOTE — DIAGNOSTIC IMAGING REPORT
EXAM: PA and lateral chest



FINDINGS:  

Heart size and pulmonary vascularity are normal. The lungs are

clear. There are no effusions or pneumothoraces.



IMPRESSION:

Negative chest.



Dictated by: 



  Dictated on workstation # [id number]

## 2018-05-18 ENCOUNTER — HOSPITAL ENCOUNTER (OUTPATIENT)
Dept: HOSPITAL 75 - ENDO | Age: 68
Discharge: HOME | End: 2018-05-18
Attending: SURGERY
Payer: MEDICARE

## 2018-05-18 VITALS — HEIGHT: 64 IN | BODY MASS INDEX: 39.14 KG/M2 | WEIGHT: 229.25 LBS

## 2018-05-18 VITALS — SYSTOLIC BLOOD PRESSURE: 152 MMHG | DIASTOLIC BLOOD PRESSURE: 83 MMHG

## 2018-05-18 VITALS — DIASTOLIC BLOOD PRESSURE: 104 MMHG | SYSTOLIC BLOOD PRESSURE: 178 MMHG

## 2018-05-18 VITALS — SYSTOLIC BLOOD PRESSURE: 163 MMHG | DIASTOLIC BLOOD PRESSURE: 101 MMHG

## 2018-05-18 VITALS — DIASTOLIC BLOOD PRESSURE: 101 MMHG | SYSTOLIC BLOOD PRESSURE: 163 MMHG

## 2018-05-18 DIAGNOSIS — K58.9: ICD-10-CM

## 2018-05-18 DIAGNOSIS — D64.9: ICD-10-CM

## 2018-05-18 DIAGNOSIS — E66.01: ICD-10-CM

## 2018-05-18 DIAGNOSIS — Z79.84: ICD-10-CM

## 2018-05-18 DIAGNOSIS — K44.9: ICD-10-CM

## 2018-05-18 DIAGNOSIS — I10: ICD-10-CM

## 2018-05-18 DIAGNOSIS — G47.33: ICD-10-CM

## 2018-05-18 DIAGNOSIS — Z79.899: ICD-10-CM

## 2018-05-18 DIAGNOSIS — K64.1: ICD-10-CM

## 2018-05-18 DIAGNOSIS — K29.70: ICD-10-CM

## 2018-05-18 DIAGNOSIS — Z79.82: ICD-10-CM

## 2018-05-18 DIAGNOSIS — Z98.84: ICD-10-CM

## 2018-05-18 DIAGNOSIS — E11.43: ICD-10-CM

## 2018-05-18 DIAGNOSIS — K21.0: Primary | ICD-10-CM

## 2018-05-18 PROCEDURE — 88305 TISSUE EXAM BY PATHOLOGIST: CPT

## 2018-05-18 PROCEDURE — 88342 IMHCHEM/IMCYTCHM 1ST ANTB: CPT

## 2018-05-18 RX ADMIN — FENTANYL CITRATE PRN MCG: 50 INJECTION, SOLUTION INTRAMUSCULAR; INTRAVENOUS at 12:05

## 2018-05-18 RX ADMIN — MIDAZOLAM HYDROCHLORIDE PRN MG: 1 INJECTION, SOLUTION INTRAMUSCULAR; INTRAVENOUS at 12:10

## 2018-05-18 RX ADMIN — MIDAZOLAM HYDROCHLORIDE PRN MG: 1 INJECTION, SOLUTION INTRAMUSCULAR; INTRAVENOUS at 11:42

## 2018-05-18 RX ADMIN — MIDAZOLAM HYDROCHLORIDE PRN MG: 1 INJECTION, SOLUTION INTRAMUSCULAR; INTRAVENOUS at 12:01

## 2018-05-18 RX ADMIN — MIDAZOLAM HYDROCHLORIDE PRN MG: 1 INJECTION, SOLUTION INTRAMUSCULAR; INTRAVENOUS at 12:04

## 2018-05-18 RX ADMIN — FENTANYL CITRATE PRN MCG: 50 INJECTION, SOLUTION INTRAMUSCULAR; INTRAVENOUS at 11:46

## 2018-05-18 RX ADMIN — FENTANYL CITRATE PRN MCG: 50 INJECTION, SOLUTION INTRAMUSCULAR; INTRAVENOUS at 12:02

## 2018-05-18 RX ADMIN — MIDAZOLAM HYDROCHLORIDE PRN MG: 1 INJECTION, SOLUTION INTRAMUSCULAR; INTRAVENOUS at 11:50

## 2018-05-18 RX ADMIN — FENTANYL CITRATE PRN MCG: 50 INJECTION, SOLUTION INTRAMUSCULAR; INTRAVENOUS at 12:19

## 2018-05-18 RX ADMIN — MIDAZOLAM HYDROCHLORIDE PRN MG: 1 INJECTION, SOLUTION INTRAMUSCULAR; INTRAVENOUS at 11:45

## 2018-05-18 RX ADMIN — FENTANYL CITRATE PRN MCG: 50 INJECTION, SOLUTION INTRAMUSCULAR; INTRAVENOUS at 11:43

## 2018-05-18 NOTE — CONSCIOUS SEDATION/ASA
Conscious Sedation Pre-Proced


Time Reviewed:  11:20


ASA Class:  2











Airway Mallampati Classification: ([place] appropriate class) I.  II.  III,  IV


 


Lungs 


 


Heart 


 


 ASA score


 


 ASA 1: a normal healthy patient


 


 ASA 2:  a patient with a mild systemic disease (mid diabetes, controlled 

hypertension, obesity 


 


 ASA 3:  a patient with a severe systemic disease that limits activity  (angina

, COPD, prior Myocardial infarction)


 


 ASA 4:  a patient with an incapacitating disease that is a constant threat to 

life (CHF, renal failure)


 


 ASA 5:  a moribund patient not expected to survive 24 hrs.  (ruptured aneurysm)


 


 ASA 6:  a declared brain dead patient whose organs are being harvested.


 


 For emergent operations, add the letter E after the classification








Grade 2


Sedation Plan:  Analgesia, Amnesia, Plan communicated to team members, 

Discussed options with patient/fam, Discussed risks with patient/fam


Note


The patient is an appropriate candidate to undergo the planned procedure, 

sedation, and anesthesia.





The patient immediately re-assessed prior to indication.











HERRERA SESAY MD May 18, 2018 11:28 am

## 2018-05-18 NOTE — PROGRESS NOTE-POST OPERATIVE
Post-Operative Progess Note


Surgeon (s)/Assistant (s)


Surgeon


HERRERA SESAY MD


Assistant:  none





Pre-Operative Diagnosis


anemia





Post-Operative Diagnosis





intact gastric pouch, small HH(2cm), no marginal ulcers.


chronic stage 2-3 ext and int hemorrhoids.





Procedure & Operative Findings


Date of Procedure


5/18/18


Procedure Performed/Findings


EGD with bx.


Colonoscopy.


Anesthesia Type


CS





Estimated Blood Loss


Estimated blood loss (mL):  minimal





Specimens/Packing


Specimens Removed


GE jxn, antrum











HERREAR SESAY MD May 18, 2018 12:35 pm

## 2018-05-18 NOTE — PROGRESS NOTE-PRE OPERATIVE
Pre-Operative Progress Note


H&P Reviewed


The H&P was reviewed, patient examined and no changes noted.


Date Seen by Provider:  May 18, 2018


Time Seen by Provider:  11:20


Date H&P Reviewed:  May 18, 2018


Time H&P Reviewed:  11:20


Pre-Operative Diagnosis:  anemia











HERRERA SESAY MD May 18, 2018 11:29 am

## 2018-05-18 NOTE — DISCHARGE INST-SURGICAL
D/C Lap Instructions-LZÁARO


Follow Up Appt in 2 weeks





Activity as tolerated





High Fiber Diet 25g or more per day





Avoid Alcohol, Caffeine, Spicy Greasy and Acid foods.





Drink 64 fluid oz or more of fluids per day.





Symptoms to Report: Fever over 101 degree F, Nausea/Vomiting 


If any problems/questions: Contact your physician or go to Emergency Room











HERRERA SESAY MD May 18, 2018 12:36 pm

## 2018-05-19 NOTE — OPERATIVE REPORT
DATE OF SERVICE:  05/18/2018



ATTENDING PHYSICIAN:

Dr. Sin.



PREOPERATIVE DIAGNOSIS:

Anemia.



POSTOPERATIVE DIAGNOSES:

Reflux esophagitis class B, small hiatal hernia approximately 2 cm in size,

intact gastric pouch, no marginal ulcers or active bleeding.  Mild gastritis of

the distal antrum.  Chronic between stage II and III external and internal

hemorrhoids.  The remainder of the colon and rectum were normal.



PROCEDURES:

EGD with biopsy and colonoscopy.



SURGEON:

Herrera Sesay MD.



ANESTHESIA:

Conscious sedation.



ESTIMATED BLOOD LOSS:

Minimal.



FINDINGS:

1.  EGD:  Reflux esophagitis class B, small hiatal hernia approximately 2 cm in

size, intact gastric pouch, status post gastric sleeve resection with no

marginal ulcerations or active bleeding.  There was some mild gastritis of the

antral remnant, no ulcerations or bleeding.  Pylorus and duodenum appeared

normal with no distal obstructions.

2.  COLONOSCOPY:  Chronic stage between stage II and III external and internal

hemorrhoids, slightly irritated most likely secondary to the colonic prep. 

There was no active bleeding identified.  The remainder of the rectum and colon

were normal.  There were no polyps identified as well as no bleeding sources.



DISPOSITION:

The patient tolerated the procedure well.



INDICATIONS:

The patient is a 67-year-old female known to us.  She has had a longstanding

history of morbid obesity.  She underwent a laparoscopic adjustable gastric band

with a VG band in 2006.  She was unsuccessful with this and this was removed and

then she underwent an interval gastric sleeve resection on 10/19/2017.  She has

been noncompliant and states that she has not done well with the weight loss. 

She reports that because of this she has essentially given up and has been

completely noncompliant and eats whatever she wants.  During this process, she

has reported worsening shortness of breath and fatigue and was found to be

anemic.



DESCRIPTION OF PROCEDURE:

The patient was brought to the endoscopy suite, laid in the left lateral

decubitus position.  After adequate IV pain and sedative medications and

conscious sedation anesthesia, the mouthpiece was applied.



Endoscope was placed in the mouth, visualizing the pharynx and hypopharyngeal

region.  Vocal cords, epiglottis and vallecula identified and appeared to be

normal.  Endoscope was gently intubated at the esophageal opening and the

esophagus insufflated.  The endoscope was then advanced to the 1st, 2nd and 3rd

portions of the esophagus at the level of the GE junction, a reflux esophagitis

class B identified.  There were no ulcers or strictures identified in this

region.  A biopsy was taken with forceps with visualization of good hemostasis.



The endoscope was then advanced into the stomach and the endoscope retroflexed,

visualizing a small hiatal hernia approximately 2 cm in size.  There was an

intact gastric pouch, status post gastric sleeve resection.  The gastric pouch

appeared to be slightly larger in size; however, there were no marginal

ulcerations or any active bleeding sources identified.  Towards the antral

remnant of the stomach, there was a mild to moderate gastritis, no ulcers or any

bleeding sources.  A biopsy was taken of this region with forceps with

visualization of good hemostasis.  The endoscope was then advanced through the

pylorus into the first and second portions of duodenum, which appeared normal

with no distal obstructions.  The endoscope was then withdrawn while taking a

second look and suctioning of residual air with no additional findings.



The patient tolerated this portion of the procedure well.  There was no bleeding

source identified and it appears that she does have an intact gastric sleeve

resection with no signs of ulcerations.  She does have a small hiatal hernia;

however, there are no Michel ulcers to indicate any bleeding from this region. 

We will recommend continued medical management with a proper diet and exercise

with a high protein diet again with at least 50 grams of protein daily from a

lean meat protein sources and should be first and foremost to augment

restriction.  She also needs to restart and maintain a regularly scheduled

intense exercise activity for 150 minutes weekly.



Under the same anesthesia, we then proceeded with the colonoscopy portion of the

procedure.  A digital rectal examination was performed, which revealed chronic

between stage II and III external and internal hemorrhoids not actively

edematous nor inflamed and no bleeding.  There was slight irritation of the

hemorrhoidal cushions most likely secondary to the colonic preparation; however,

there was no active bleeding identified.  Normal sphincter tone was felt and

there were no palpable masses.



The endoscope was then intubated to the anus and rectum was gently insufflated. 

The endoscope was then advanced through the valves of Titus in the rectum with

no polyps or any neoplasms identified.  Endoscope was then advanced through the

sigmoid colon where no diverticulosis was identified.  Endoscope was then

advanced through the remainder of the descending, transverse and ascending colon

to the cecum.  These segments were normal.  There were no polyps, neoplasms or

any mucosal inflammatory changes nor any active bleeding source identified

throughout the colon or rectum.  The endoscope was then slowly withdrawn while

taking a second look and suctioning the residual air with no additional

findings.



The patient tolerated the procedure well.  We will recommend a high-fiber diet

with at least 25 grams of fiber per day as well as at least 64 fluid ounces of

water daily to promote soft stools on a daily basis.  At this time, we are

unsure of the etiology of her anemia.  There were no active bleeding sources

identified throughout the upper or lower gastrointestinal tract.  There does not

appear to be any component of gastrointestinal bleeding as well.  We will

continue to monitor her laboratory work as well as to potentially redraw her

iron and vitamin B12.





Job ID: [id number]

DocumentID: [id number]

Dictated Date:  05/18/2018 12:46:07

Transcription Date: 05/19/2018 00:35:14

Dictated By: HERRERA SESAY MD

## 2018-05-21 ENCOUNTER — HOSPITAL ENCOUNTER (OUTPATIENT)
Dept: HOSPITAL 75 - CARD | Age: 68
End: 2018-05-21
Attending: INTERNAL MEDICINE
Payer: MEDICARE

## 2018-05-21 DIAGNOSIS — E11.9: Primary | ICD-10-CM

## 2018-05-21 DIAGNOSIS — Z87.891: ICD-10-CM

## 2018-05-21 DIAGNOSIS — Z82.49: ICD-10-CM

## 2018-05-21 DIAGNOSIS — E66.9: ICD-10-CM

## 2018-05-21 DIAGNOSIS — E03.9: ICD-10-CM

## 2018-05-21 PROCEDURE — 93306 TTE W/DOPPLER COMPLETE: CPT

## 2018-05-23 ENCOUNTER — HOSPITAL ENCOUNTER (OUTPATIENT)
Dept: HOSPITAL 75 - CARD | Age: 68
End: 2018-05-23
Attending: INTERNAL MEDICINE
Payer: MEDICARE

## 2018-05-23 VITALS — DIASTOLIC BLOOD PRESSURE: 83 MMHG | SYSTOLIC BLOOD PRESSURE: 127 MMHG

## 2018-05-23 VITALS — BODY MASS INDEX: 40.29 KG/M2 | HEIGHT: 64 IN | WEIGHT: 236 LBS

## 2018-05-23 DIAGNOSIS — E03.9: ICD-10-CM

## 2018-05-23 DIAGNOSIS — Z87.891: ICD-10-CM

## 2018-05-23 DIAGNOSIS — E11.9: Primary | ICD-10-CM

## 2018-05-23 DIAGNOSIS — E66.9: ICD-10-CM

## 2018-05-23 DIAGNOSIS — Z82.49: ICD-10-CM

## 2018-05-23 PROCEDURE — 93017 CV STRESS TEST TRACING ONLY: CPT

## 2018-05-23 PROCEDURE — 78452 HT MUSCLE IMAGE SPECT MULT: CPT

## 2018-05-23 NOTE — STRESS TEST
DATE OF SERVICE:  05/23/2018



EXERCISE MYOVIEW STRESS TEST REPORT



REFERRING PHYSICIAN:

Federica Sin MD



Baseline heart rate is 69.  Baseline blood pressure is 151/88.  Baseline EKG is

sinus rhythm with no ischemic changes.



In summary, the patient was injected with 10.78 mCi of technetium-99 Myoview and

the resting images were obtained.  Then, the patient started exercising with a

baseline heart rate, blood pressure and EKG mentioned above.  She was able to

exercise for a total of 3 minutes on standard Marquise protocol.  With peak

exercise level, EKG was showing minimal nondiagnostic changes.  Blood pressure

at peak was 196/90.  During recovery, heart rate and blood pressure returned to

baseline.  EKG returned to baseline.



The resting and stress images were reviewed and compared in the short axis,

horizontal long axis, and vertical long axis views.  Review of the images showed

dense breast tissue affecting the quality of the images with typical female

pattern, mild decreased uptake at the mid to apical anterolateral wall with

subtle reversibility.  SSS is 3, SDS 3, TID value 1.09.  On the gated images,

the left ventricle appeared to be in normal size with normal contractility. 

Calculated ejection fraction 69%.



CONCLUSION:

1.  Poor exercise tolerance, a total of 3 minutes on standard Marquise protocol,

total of 4.6 METS achieving 90% of maximum expected heart rate.

2.  Hypertensive response to exercise, returned to baseline during recovery.

3.  Breast attenuation with dense breast tissues affecting the quality of the

images, overall there is no significant ischemia or infarction on SPECT images.

4.  Normal left ventricular size with normal contractility.  Calculated ejection

fraction 69%.





Job ID: [id number]

DocumentID: [id number]

Dictated Date:  05/23/2018 15:20:51

Transcription Date: 05/23/2018 18:18:31

Dictated By: LUCIANA ECHOLS MD

## 2018-09-06 ENCOUNTER — HOSPITAL ENCOUNTER (OUTPATIENT)
Dept: HOSPITAL 75 - RAD | Age: 68
End: 2018-09-06
Attending: NURSE PRACTITIONER
Payer: MEDICARE

## 2018-09-06 DIAGNOSIS — M48.061: Primary | ICD-10-CM

## 2018-09-06 DIAGNOSIS — M43.16: ICD-10-CM

## 2018-09-06 DIAGNOSIS — M51.37: ICD-10-CM

## 2018-09-06 DIAGNOSIS — M99.73: ICD-10-CM

## 2018-09-06 DIAGNOSIS — M51.27: ICD-10-CM

## 2018-09-06 DIAGNOSIS — M46.86: ICD-10-CM

## 2018-09-06 PROCEDURE — 72148 MRI LUMBAR SPINE W/O DYE: CPT

## 2018-09-06 NOTE — DIAGNOSTIC IMAGING REPORT
PROCEDURE: MRI lumbar spine.



TECHNIQUE: Multiplanar, multisequence MRI of the lumbar spine was

performed without contrast.



INDICATION: Right foot drop



COMPARISON: 05/10/2004



FINDINGS: For the purposes of this exam, last well-formed disc

space is denoted the L5-S1 level. Evaluation of the static

alignment demonstrates slight grade 1 anterolisthesis of L4-L5.

There is no evidence of jumped facets.



Vertebral body heights are maintained. There is no evidence of

acute fracture. Marrow signal demonstrates Modic type II change

of the adjacent endplates at the L1-L2 and L5-S1 levels.

Otherwise, marrow signal is unremarkable. There is multilevel

intervertebral disc height loss with anterior and posterior

bulging, also greatest at L1-L2 and L5-S1 levels.



Visualized portions of the distal cord are unremarkable. Conus

terminates at approximately the L1 level. No abnormal intrathecal

filling defects are seen. Pre-and paravertebral soft tissue

structures are unremarkable.



Axial images demonstrate the following: T12-L1: There is no large

disc bulge or focal protrusion. There is no significant spinal

canal or neuroforaminal stenosis



L1-L2:  There is mild broad-based posterior disc bulge and

minimal ligamentum flavum laxity. As a result, there is mild

narrowing of the spinal canal and bilateral neuroforamen.



L2-L3: There is no large disc bulge or focal protrusion. There is

no significant spinal canal or neuroforaminal stenosis.



L3-L4: There is no large disc bulge or focal protrusion. There is

no significant spinal canal or neuroforaminal stenosis.



L4-L5: There is mild broad-based posterior disc bulge with

probable posterior annular tear. There is also moderate bilateral

facet arthropathy. As a result, there is mild narrowing of the

spinal canal and bilateral neuroforamen.



L5-S1: Posterior disc osteophyte complex formations are

identified posteriorly, bilaterally. There is also posterior

annular tear and bilateral facet arthropathy. As a result, there

is mild narrowing of the bilateral neuroforamen. There is no

significant spinal canal stenosis



IMPRESSION:

1. Multilevel degenerative changes of the lumbar spine as

described above.

2. No acute fracture or dislocation.



Dictated by: 



  Dictated on workstation # [id number]

## 2019-01-04 VITALS — SYSTOLIC BLOOD PRESSURE: 137 MMHG | DIASTOLIC BLOOD PRESSURE: 67 MMHG

## 2019-01-04 RX ADMIN — SODIUM CHLORIDE SCH MG: 900 INJECTION, SOLUTION INTRAVENOUS at 10:55

## 2019-01-04 RX ADMIN — ACETAMINOPHEN SCH MG: 325 TABLET ORAL at 10:46

## 2019-01-07 VITALS — SYSTOLIC BLOOD PRESSURE: 155 MMHG | DIASTOLIC BLOOD PRESSURE: 94 MMHG

## 2019-01-07 RX ADMIN — ACETAMINOPHEN SCH MG: 325 TABLET ORAL at 11:50

## 2019-01-07 RX ADMIN — SODIUM CHLORIDE SCH MG: 900 INJECTION, SOLUTION INTRAVENOUS at 12:18

## 2019-01-09 VITALS — SYSTOLIC BLOOD PRESSURE: 144 MMHG | DIASTOLIC BLOOD PRESSURE: 71 MMHG

## 2019-01-09 RX ADMIN — SODIUM CHLORIDE SCH MG: 900 INJECTION, SOLUTION INTRAVENOUS at 13:38

## 2019-01-09 RX ADMIN — DIPHENHYDRAMINE HCL SCH MG: 25 TABLET ORAL at 13:33

## 2019-01-09 RX ADMIN — ACETAMINOPHEN SCH MG: 325 TABLET ORAL at 13:34

## 2019-01-14 VITALS — DIASTOLIC BLOOD PRESSURE: 77 MMHG | SYSTOLIC BLOOD PRESSURE: 140 MMHG

## 2019-01-14 RX ADMIN — SODIUM CHLORIDE SCH MG: 900 INJECTION, SOLUTION INTRAVENOUS at 08:14

## 2019-01-14 RX ADMIN — ACETAMINOPHEN SCH MG: 325 TABLET ORAL at 08:14

## 2019-01-14 RX ADMIN — DIPHENHYDRAMINE HCL SCH MG: 25 TABLET ORAL at 08:14

## 2019-01-16 ENCOUNTER — HOSPITAL ENCOUNTER (OUTPATIENT)
Dept: HOSPITAL 75 - SDC | Age: 69
LOS: 2 days | Discharge: HOME | End: 2019-01-18
Attending: FAMILY MEDICINE
Payer: MEDICARE

## 2019-01-16 VITALS
HEIGHT: 64 IN | DIASTOLIC BLOOD PRESSURE: 84 MMHG | WEIGHT: 232 LBS | SYSTOLIC BLOOD PRESSURE: 140 MMHG | BODY MASS INDEX: 39.61 KG/M2

## 2019-01-16 DIAGNOSIS — D50.9: Primary | ICD-10-CM

## 2019-01-16 PROCEDURE — 96374 THER/PROPH/DIAG INJ IV PUSH: CPT

## 2019-01-16 PROCEDURE — 96365 THER/PROPH/DIAG IV INF INIT: CPT

## 2019-01-16 RX ADMIN — DIPHENHYDRAMINE HCL SCH MG: 25 TABLET ORAL at 11:32

## 2019-08-30 VITALS — SYSTOLIC BLOOD PRESSURE: 191 MMHG | DIASTOLIC BLOOD PRESSURE: 84 MMHG

## 2019-08-30 RX ADMIN — SODIUM CHLORIDE SCH MLS/HR: 900 INJECTION, SOLUTION INTRAVENOUS at 11:29

## 2019-09-06 ENCOUNTER — HOSPITAL ENCOUNTER (OUTPATIENT)
Dept: HOSPITAL 75 - SDC | Age: 69
Discharge: HOME | End: 2019-09-06
Attending: FAMILY MEDICINE
Payer: MEDICARE

## 2019-09-06 VITALS — SYSTOLIC BLOOD PRESSURE: 145 MMHG | DIASTOLIC BLOOD PRESSURE: 71 MMHG

## 2019-09-06 VITALS — BODY MASS INDEX: 39.61 KG/M2 | HEIGHT: 64 IN | WEIGHT: 232 LBS

## 2019-09-06 DIAGNOSIS — D50.8: Primary | ICD-10-CM

## 2019-09-06 PROCEDURE — 96365 THER/PROPH/DIAG IV INF INIT: CPT

## 2019-09-06 RX ADMIN — SODIUM CHLORIDE SCH MLS/HR: 900 INJECTION, SOLUTION INTRAVENOUS at 11:28

## 2019-09-23 ENCOUNTER — HOSPITAL ENCOUNTER (OUTPATIENT)
Dept: HOSPITAL 75 - RAD | Age: 69
End: 2019-09-23
Attending: NURSE PRACTITIONER
Payer: MEDICARE

## 2019-09-23 DIAGNOSIS — Z12.31: Primary | ICD-10-CM

## 2019-09-23 PROCEDURE — 77067 SCR MAMMO BI INCL CAD: CPT

## 2019-09-23 NOTE — DIAGNOSTIC IMAGING REPORT
Indication: Routine screening.



Comparison is made with prior mammogram from 04/11/2018 and

04/03/2017.



2-D and 3-D bilateral screening mammography was performed with

CAD.



Scattered fibroglandular densities are identified bilaterally.

Scattered benign-appearing calcifications again noted

bilaterally. No mass or malignant-appearing microcalcifications

are seen. The axillae are unremarkable.



Impression: BI-RADS category 2



No mammographic features suspicious for malignancy are

identified.



ACR BI-RADS Category 2: Benign findings.

Result letter will be mailed to the patient.

Note: At least 10% of breast cancer is not imaged by mammography.



Dictated by: 



  Dictated on workstation # [id number]

## 2020-06-15 ENCOUNTER — HOSPITAL ENCOUNTER (OUTPATIENT)
Dept: HOSPITAL 75 - PREOP | Age: 70
Discharge: HOME | End: 2020-06-15
Attending: SURGERY
Payer: MEDICARE

## 2020-06-15 VITALS — WEIGHT: 235.45 LBS | HEIGHT: 63.78 IN | BODY MASS INDEX: 40.7 KG/M2

## 2020-06-15 DIAGNOSIS — Z11.59: ICD-10-CM

## 2020-06-15 DIAGNOSIS — Z01.818: Primary | ICD-10-CM

## 2020-06-15 PROCEDURE — 87635 SARS-COV-2 COVID-19 AMP PRB: CPT

## 2020-06-18 ENCOUNTER — HOSPITAL ENCOUNTER (OUTPATIENT)
Dept: HOSPITAL 75 - SDC | Age: 70
Discharge: HOME | End: 2020-06-18
Attending: SURGERY
Payer: MEDICARE

## 2020-06-18 VITALS — DIASTOLIC BLOOD PRESSURE: 72 MMHG | SYSTOLIC BLOOD PRESSURE: 130 MMHG

## 2020-06-18 VITALS — WEIGHT: 235.45 LBS | BODY MASS INDEX: 40.7 KG/M2 | HEIGHT: 63.78 IN

## 2020-06-18 VITALS — SYSTOLIC BLOOD PRESSURE: 142 MMHG | DIASTOLIC BLOOD PRESSURE: 68 MMHG

## 2020-06-18 VITALS — SYSTOLIC BLOOD PRESSURE: 131 MMHG | DIASTOLIC BLOOD PRESSURE: 65 MMHG

## 2020-06-18 VITALS — DIASTOLIC BLOOD PRESSURE: 73 MMHG | SYSTOLIC BLOOD PRESSURE: 147 MMHG

## 2020-06-18 VITALS — DIASTOLIC BLOOD PRESSURE: 88 MMHG | SYSTOLIC BLOOD PRESSURE: 178 MMHG

## 2020-06-18 VITALS — DIASTOLIC BLOOD PRESSURE: 68 MMHG | SYSTOLIC BLOOD PRESSURE: 141 MMHG

## 2020-06-18 VITALS — SYSTOLIC BLOOD PRESSURE: 132 MMHG | DIASTOLIC BLOOD PRESSURE: 68 MMHG

## 2020-06-18 VITALS — DIASTOLIC BLOOD PRESSURE: 63 MMHG | SYSTOLIC BLOOD PRESSURE: 118 MMHG

## 2020-06-18 VITALS — SYSTOLIC BLOOD PRESSURE: 122 MMHG | DIASTOLIC BLOOD PRESSURE: 62 MMHG

## 2020-06-18 VITALS — DIASTOLIC BLOOD PRESSURE: 65 MMHG | SYSTOLIC BLOOD PRESSURE: 127 MMHG

## 2020-06-18 VITALS — DIASTOLIC BLOOD PRESSURE: 73 MMHG | SYSTOLIC BLOOD PRESSURE: 128 MMHG

## 2020-06-18 VITALS — SYSTOLIC BLOOD PRESSURE: 147 MMHG | DIASTOLIC BLOOD PRESSURE: 73 MMHG

## 2020-06-18 DIAGNOSIS — E11.40: ICD-10-CM

## 2020-06-18 DIAGNOSIS — K21.9: ICD-10-CM

## 2020-06-18 DIAGNOSIS — Z79.82: ICD-10-CM

## 2020-06-18 DIAGNOSIS — M79.10: ICD-10-CM

## 2020-06-18 DIAGNOSIS — M06.9: ICD-10-CM

## 2020-06-18 DIAGNOSIS — I10: ICD-10-CM

## 2020-06-18 DIAGNOSIS — Z79.899: ICD-10-CM

## 2020-06-18 DIAGNOSIS — M48.00: ICD-10-CM

## 2020-06-18 DIAGNOSIS — E89.0: ICD-10-CM

## 2020-06-18 DIAGNOSIS — F32.9: ICD-10-CM

## 2020-06-18 DIAGNOSIS — Z98.84: ICD-10-CM

## 2020-06-18 DIAGNOSIS — K43.0: Primary | ICD-10-CM

## 2020-06-18 DIAGNOSIS — G47.33: ICD-10-CM

## 2020-06-18 DIAGNOSIS — Z87.891: ICD-10-CM

## 2020-06-18 DIAGNOSIS — Z79.84: ICD-10-CM

## 2020-06-18 DIAGNOSIS — F41.9: ICD-10-CM

## 2020-06-18 DIAGNOSIS — K58.9: ICD-10-CM

## 2020-06-18 DIAGNOSIS — Z11.2: ICD-10-CM

## 2020-06-18 DIAGNOSIS — E66.9: ICD-10-CM

## 2020-06-18 DIAGNOSIS — E78.5: ICD-10-CM

## 2020-06-18 LAB
BASOPHILS # BLD AUTO: 0 10^3/UL (ref 0–0.1)
BASOPHILS NFR BLD AUTO: 1 % (ref 0–10)
EOSINOPHIL # BLD AUTO: 0.4 10^3/UL (ref 0–0.3)
EOSINOPHIL NFR BLD AUTO: 8 % (ref 0–10)
ERYTHROCYTE [DISTWIDTH] IN BLOOD BY AUTOMATED COUNT: 13.7 % (ref 10–14.5)
HCT VFR BLD CALC: 45 % (ref 35–52)
HGB BLD-MCNC: 15.1 G/DL (ref 11.5–16)
LYMPHOCYTES # BLD AUTO: 1.2 X 10^3 (ref 1–4)
LYMPHOCYTES NFR BLD AUTO: 26 % (ref 12–44)
MANUAL DIFFERENTIAL PERFORMED BLD QL: NO
MCH RBC QN AUTO: 30 PG (ref 25–34)
MCHC RBC AUTO-ENTMCNC: 34 G/DL (ref 32–36)
MCV RBC AUTO: 88 FL (ref 80–99)
MONOCYTES # BLD AUTO: 0.3 X 10^3 (ref 0–1)
MONOCYTES NFR BLD AUTO: 6 % (ref 0–12)
NEUTROPHILS # BLD AUTO: 2.9 X 10^3 (ref 1.8–7.8)
NEUTROPHILS NFR BLD AUTO: 61 % (ref 42–75)
PLATELET # BLD: 195 10^3/UL (ref 130–400)
PMV BLD AUTO: 10.4 FL (ref 7.4–10.4)
WBC # BLD AUTO: 4.8 10^3/UL (ref 4.3–11)

## 2020-06-18 PROCEDURE — 36415 COLL VENOUS BLD VENIPUNCTURE: CPT

## 2020-06-18 PROCEDURE — 87081 CULTURE SCREEN ONLY: CPT

## 2020-06-18 PROCEDURE — 85025 COMPLETE CBC W/AUTO DIFF WBC: CPT

## 2020-06-18 PROCEDURE — 49655: CPT

## 2020-06-18 PROCEDURE — 82962 GLUCOSE BLOOD TEST: CPT

## 2020-06-18 RX ADMIN — SODIUM CHLORIDE, SODIUM LACTATE, POTASSIUM CHLORIDE, AND CALCIUM CHLORIDE PRN MLS/HR: 600; 310; 30; 20 INJECTION, SOLUTION INTRAVENOUS at 10:03

## 2020-06-18 RX ADMIN — SODIUM CHLORIDE, SODIUM LACTATE, POTASSIUM CHLORIDE, AND CALCIUM CHLORIDE PRN MLS/HR: 600; 310; 30; 20 INJECTION, SOLUTION INTRAVENOUS at 08:45

## 2020-06-18 NOTE — PROGRESS NOTE-PRE OPERATIVE
Pre-Operative Progress Note


H&P Reviewed


The H&P was reviewed, patient examined and no changes noted.


Date Seen by Provider:  Jun 18, 2020


Time Seen by Provider:  08:38


Date H&P Reviewed:  Jun 18, 2020


Time H&P Reviewed:  08:38


Pre-Operative Diagnosis:  incisional hernia











FAISAL MERCADO DO              Jun 18, 2020 08:38

## 2020-06-18 NOTE — DISCHARGE INST-SIMPLE/STANDARD
Discharge Inst-Standard


Discharge Medications


New, Converted or Re-Newed RX:  RX on Chart





Patient Instructions/Follow Up


Plan of Care/Instructions/FU:  


2 weeks Kelly


Activity as Tolerated:  No


Discharge Diet:  Regular Diet


Other Inst to Patient


Follow up Appt:


Make appointment for 2 week.





Instructions:


No lifting greater than 10 pounds.


No strenuous activity. 


May shower in 24 hours, no tub bath or soaking.


Use incentive spirometer at home as directed.


No Smoking





Skin/Wound Care:


You have special glue over your incision that will fall off on it's own. 





Symptoms to Report:


Appetite Changes, Extremity Discoloration, Numbness/Tingling, Swelling 

Increased, Bleeding Excessive, Eyesight Changes, Pain Increased, Urine Color 

Change, Constipation(Persistent), Fever over 101 degree F, Pain/Pressure in 

chest, Urinating Difficulty, Cough Up/Vomit Blood, Heart Beat Irreg/Pounding, 

Pain/Pressure in jaw, Vaginal Bleeding Increase, Cramps in feet or legs, 

Lightheadedness, Pain/Pressure in shoulder, Diarrhea(Persistent), Memory Changes

Suddenly, Questions/Concerns, Weight gain consecutive days, Dizziness/Fainting, 

Nausea/Vomiting, Shortness of Breath, Weight gain over 2 pounds








If questions or concerns contact your physician 


Or seek help at emergency department.











FAISAL MERCADO DO              Jun 18, 2020 10:20

## 2021-08-06 ENCOUNTER — HOSPITAL ENCOUNTER (OUTPATIENT)
Dept: HOSPITAL 75 - SDC | Age: 71
LOS: 90 days | Discharge: HOME | End: 2021-11-04
Attending: FAMILY MEDICINE
Payer: MEDICARE

## 2021-08-06 VITALS — SYSTOLIC BLOOD PRESSURE: 152 MMHG | DIASTOLIC BLOOD PRESSURE: 87 MMHG

## 2021-08-06 DIAGNOSIS — D50.9: Primary | ICD-10-CM

## 2021-08-06 PROCEDURE — 96365 THER/PROPH/DIAG IV INF INIT: CPT

## 2021-08-17 ENCOUNTER — HOSPITAL ENCOUNTER (OUTPATIENT)
Dept: HOSPITAL 75 - RAD | Age: 71
End: 2021-08-17
Attending: NURSE PRACTITIONER
Payer: MEDICARE

## 2021-08-17 DIAGNOSIS — Z12.31: Primary | ICD-10-CM

## 2021-08-17 PROCEDURE — 77067 SCR MAMMO BI INCL CAD: CPT

## 2021-08-17 PROCEDURE — 77063 BREAST TOMOSYNTHESIS BI: CPT

## 2021-08-17 NOTE — DIAGNOSTIC IMAGING REPORT
INDICATION: 

Routine screening.



COMPARISON:    

09/23/2019 and 04/11/2018.



TECHNIQUE: 

2D and 3D bilateral screening mammography was performed with CAD.



FINDINGS:

Scattered fibroglandular densities are identified bilaterally.

Occasional benign calcifications are noted. No mass or

malignant-appearing microcalcifications are seen. The axillae are

unremarkable.



IMPRESSION: 

No mammographic features suspicious for malignancy are

identified.



ACR BI-RADS Category 2: Benign findings.

Result letter will be mailed to the patient.

Note: At least 10% of breast cancer is not imaged by mammography.



Dictated by: 



  Dictated on workstation # [id number]

## 2021-10-22 ENCOUNTER — HOSPITAL ENCOUNTER (OUTPATIENT)
Dept: HOSPITAL 75 - PREOP | Age: 71
Discharge: HOME | End: 2021-10-22
Attending: SURGERY
Payer: MEDICARE

## 2021-10-22 VITALS — HEIGHT: 64.02 IN | WEIGHT: 194.01 LBS | BODY MASS INDEX: 33.12 KG/M2

## 2021-10-22 DIAGNOSIS — K21.9: ICD-10-CM

## 2021-10-22 DIAGNOSIS — Z98.84: ICD-10-CM

## 2021-10-22 DIAGNOSIS — Z20.822: ICD-10-CM

## 2021-10-22 DIAGNOSIS — Z01.812: Primary | ICD-10-CM

## 2021-10-22 PROCEDURE — 87635 SARS-COV-2 COVID-19 AMP PRB: CPT

## 2021-10-26 ENCOUNTER — HOSPITAL ENCOUNTER (OUTPATIENT)
Dept: HOSPITAL 75 - ENDO | Age: 71
Discharge: HOME | End: 2021-10-26
Attending: SURGERY
Payer: MEDICARE

## 2021-10-26 VITALS — SYSTOLIC BLOOD PRESSURE: 151 MMHG | DIASTOLIC BLOOD PRESSURE: 72 MMHG

## 2021-10-26 VITALS — SYSTOLIC BLOOD PRESSURE: 150 MMHG | DIASTOLIC BLOOD PRESSURE: 79 MMHG

## 2021-10-26 VITALS — WEIGHT: 194.01 LBS | BODY MASS INDEX: 33.12 KG/M2 | HEIGHT: 64.02 IN

## 2021-10-26 VITALS — SYSTOLIC BLOOD PRESSURE: 148 MMHG | DIASTOLIC BLOOD PRESSURE: 69 MMHG

## 2021-10-26 VITALS — SYSTOLIC BLOOD PRESSURE: 149 MMHG | DIASTOLIC BLOOD PRESSURE: 82 MMHG

## 2021-10-26 DIAGNOSIS — Z91.048: ICD-10-CM

## 2021-10-26 DIAGNOSIS — E66.9: ICD-10-CM

## 2021-10-26 DIAGNOSIS — K57.30: ICD-10-CM

## 2021-10-26 DIAGNOSIS — Z79.899: ICD-10-CM

## 2021-10-26 DIAGNOSIS — D12.4: Primary | ICD-10-CM

## 2021-10-26 DIAGNOSIS — I10: ICD-10-CM

## 2021-10-26 DIAGNOSIS — K21.9: ICD-10-CM

## 2021-10-26 DIAGNOSIS — K62.1: ICD-10-CM

## 2021-10-26 DIAGNOSIS — K22.70: ICD-10-CM

## 2021-10-26 DIAGNOSIS — E11.9: ICD-10-CM

## 2021-10-26 DIAGNOSIS — Z80.1: ICD-10-CM

## 2021-10-26 DIAGNOSIS — K63.5: ICD-10-CM

## 2021-10-26 DIAGNOSIS — K44.9: ICD-10-CM

## 2021-10-26 DIAGNOSIS — E03.9: ICD-10-CM

## 2021-10-26 DIAGNOSIS — Z98.84: ICD-10-CM

## 2021-10-26 DIAGNOSIS — Z79.84: ICD-10-CM

## 2021-10-26 DIAGNOSIS — K29.70: ICD-10-CM

## 2021-10-26 DIAGNOSIS — E78.5: ICD-10-CM

## 2021-10-26 NOTE — ANESTHESIA-GENERAL POST-OP
MAC


Patient Condition


Mental Status/LOC:  Same as Preop


Cardiovascular:  Satisfactory


Nausea/Vomiting:  Absent


Respiratory:  Satisfactory


Pain:  Controlled


Complications:  Absent





Post Op Complications


Complications


None





Follow Up Care/Instructions


Patient Instructions


None needed.





Anesthesiology Discharge Order


Discharge Order


Patient was seen this morning after the procedure and she was doing well, no 

complaints, stable vital signs, no apparent adverse anesthesia problems.











ESTER LUTHER DO         Oct 26, 2021 14:23

## 2021-10-26 NOTE — PROGRESS NOTE-POST OPERATIVE
Post-Operative Progess Note


Surgeon (s)/Assistant (s)


Surgeon


FAISAL MERCADO DO


Assistant:  na





Pre-Operative Diagnosis


gerd, change in bowel habits, epigastric abdominal pain





Post-Operative Diagnosis





hiatal and paraesophageal hernia, gastritis, diverticulosis, colon polyps





Procedure & Operative Findings


Date of Procedure


10/26/21


Procedure Performed/Findings


egd c biopsies, colonoscopy c hot bx polypectomy x 3


Anesthesia Type


per mda





Estimated Blood Loss


Estimated blood loss (mL):  none





Specimens/Packing


Specimens Removed


antrum, body, ge, descending colon polyps x 2 and rectal polpy











FAISAL MERCADO DO              Oct 26, 2021 09:37

## 2021-10-26 NOTE — DISCHARGE INST-SIMPLE/STANDARD
Discharge Inst-Standard


Patient Instructions/Follow Up


Plan of Care/Instructions/FU:  


2 weeks Kelly


Activity as Tolerated:  Yes


Discharge Diet:  Regular Diet (high fiber)











FAISAL MERCADO DO              Oct 26, 2021 09:39

## 2021-10-26 NOTE — OPERATIVE REPORT
DATE OF SERVICE:  10/26/2021



PREOPERATIVE DIAGNOSES:

Gastroesophageal reflux disease, change in bowel habits and epigastric abdominal

pain.



POSTOPERATIVE DIAGNOSES:

Hiatal hernia, small paraesophageal hernia, gastritis, diverticulosis, and colon

polyps.



PROCEDURES PERFORMED:

EGD with biopsies, colonoscopy with hot biopsy polypectomy x3.



SURGEON:

Faisal Mendoza DO.



ANESTHESIA:

Per MDA.



ESTIMATED BLOOD LOSS:

None.



COMPLICATIONS:

None.



INDICATIONS FOR PROCEDURE:

The patient is a 71-year-old female with GERD symptoms, change in bowel habits

and having some epigastric abdominal pain.  She understands the risks and

benefits of procedure and wished to proceed.  Consent was signed in the chart.



DESCRIPTION OF PROCEDURE:

The patient was taken to endoscopy suite and placed in the left lateral

recumbent position.  Timeout was performed.  Scope was inserted in the mouth,

down the esophagus, stomach and into the duodenum without difficulty.  There

were no polyps, masses or ulcerations within the duodenum.  Scope was slowly

retracted back to the stomach, where it was further insufflated.  Evidence of a

previous sleeve gastrectomy was present.  Slight erythematous changes.  Biopsy

of the antrum was obtained.  Also, in the body, there was an area what looks to

be reactive changes.  Biopsy of this area was obtained.  Scope was retroflexed

noting a hiatal hernia and a small paraesophageal hernia.  Scope was returned to

its normal position, slowly withdrawn to the distal esophagus.  Biopsy of the GE

junction was obtained.  No polyps, masses or ulcerations.  Scope was slowly

retracted back until completely removed noting no other pathology.  Digital

rectal exam was performed.  No palpable polyps, masses or ulcerations.  Scope

was inserted in the rectum and advanced all the way to the cecum with minimal

difficulty.  Prep was adequate.  Scope was then slowly retracted back.  No

polyps, masses or ulcerations within the cecum, ascending and transverse colon. 

In the descending colon, two polyps were present, which hot biopsy polypectomy

was performed.  One was a little larger and flat.  Scope was then slowly

retracted back into the sigmoid colon; minimal amount of diverticulosis was

present.  No polyps, masses or ulcerations.  Scope was then continuously slowly

retracted back in the rectum, where a small polyp was present, which a hot

biopsy polypectomy was performed.  Scope was then continued to be retracted back

and then retroflexed noting no other pathology.  Scope was returned to its

normal position, slowly withdrawn until completely removed.  The patient

tolerated the procedure well without any complications.  She was taken to the

recovery room in stable condition.



RECOMMENDATIONS:

The patient will continue on current medications.  We will await biopsy results.

 The patient will need a repeat colonoscopy in three years for reevaluation.  I

also recommended high fiber diet.





CC:  Claire Peter - requested, unable to deliver.





Job ID: [id number]

DocumentID: [id number]

Dictated Date:  10/26/2021 09:43:07

Transcription Date: 10/26/2021 15:17:24

Dictated By: FAISAL MENDOZA DO

## 2021-12-02 ENCOUNTER — HOSPITAL ENCOUNTER (OUTPATIENT)
Dept: HOSPITAL 75 - CARD | Age: 71
End: 2021-12-02
Attending: SURGERY
Payer: MEDICARE

## 2021-12-02 DIAGNOSIS — R10.13: Primary | ICD-10-CM

## 2021-12-02 PROCEDURE — 78227 HEPATOBIL SYST IMAGE W/DRUG: CPT

## 2021-12-02 NOTE — DIAGNOSTIC IMAGING REPORT
INDICATION: Epigastric pain.



FINDINGS: Patient was administered 5.4 mCi technetium 99m

Choletec and imaging over the abdomen was performed. At one hour,

patient ingested 8 ounces of Ensure and gallbladder ejection

fraction was calculated.



There is homogeneous uptake of activity by the liver. There is

prompt excretion of activity into the gallbladder and common

duct. Normal passage of activity into the small bowel is noted.

There is also small amount of gastric uptake consistent with bile

reflux. Gallbladder ejection fraction is normal at 69%.



IMPRESSION:

1. Patent cystic duct and common bile duct with normal ejection

fraction of 69%.

2. Findings suggestive of gastric bile reflux.



Dictated by: 



  Dictated on workstation # [id number]

## 2022-10-28 ENCOUNTER — HOSPITAL ENCOUNTER (OUTPATIENT)
Dept: HOSPITAL 75 - PREOP | Age: 72
LOS: 5 days | Discharge: HOME | End: 2022-11-02
Attending: SPECIALIST
Payer: MEDICARE

## 2022-10-28 VITALS — HEIGHT: 63.98 IN | WEIGHT: 220.46 LBS | BODY MASS INDEX: 37.64 KG/M2

## 2022-10-28 DIAGNOSIS — Z01.818: Primary | ICD-10-CM

## 2022-11-04 ENCOUNTER — HOSPITAL ENCOUNTER (OUTPATIENT)
Dept: HOSPITAL 75 - SDC | Age: 72
Discharge: HOME | End: 2022-11-04
Attending: SPECIALIST
Payer: MEDICARE

## 2022-11-04 VITALS — BODY MASS INDEX: 37.64 KG/M2 | HEIGHT: 63.98 IN | WEIGHT: 220.46 LBS

## 2022-11-04 VITALS — DIASTOLIC BLOOD PRESSURE: 76 MMHG | SYSTOLIC BLOOD PRESSURE: 166 MMHG

## 2022-11-04 VITALS — DIASTOLIC BLOOD PRESSURE: 78 MMHG | SYSTOLIC BLOOD PRESSURE: 147 MMHG

## 2022-11-04 DIAGNOSIS — H25.9: ICD-10-CM

## 2022-11-04 DIAGNOSIS — Z87.891: ICD-10-CM

## 2022-11-04 DIAGNOSIS — E66.9: ICD-10-CM

## 2022-11-04 DIAGNOSIS — Z79.84: ICD-10-CM

## 2022-11-04 DIAGNOSIS — E11.36: Primary | ICD-10-CM

## 2022-11-04 PROCEDURE — 66984 XCAPSL CTRC RMVL W/O ECP: CPT

## 2022-11-04 RX ADMIN — PHENYLEPHRINE HYDROCHLORIDE SCH ML: 100 SOLUTION/ DROPS OPHTHALMIC at 11:00

## 2022-11-04 RX ADMIN — TETRACAINE HYDROCHLORIDE PRN ML: 5 SOLUTION OPHTHALMIC at 10:54

## 2022-11-04 RX ADMIN — TROPICAMIDE SCH ML: 10 SOLUTION/ DROPS OPHTHALMIC at 11:10

## 2022-11-04 RX ADMIN — TETRACAINE HYDROCHLORIDE PRN ML: 5 SOLUTION OPHTHALMIC at 11:10

## 2022-11-04 RX ADMIN — PHENYLEPHRINE HYDROCHLORIDE SCH ML: 100 SOLUTION/ DROPS OPHTHALMIC at 11:05

## 2022-11-04 RX ADMIN — TROPICAMIDE SCH ML: 10 SOLUTION/ DROPS OPHTHALMIC at 11:05

## 2022-11-04 RX ADMIN — TETRACAINE HYDROCHLORIDE PRN ML: 5 SOLUTION OPHTHALMIC at 11:00

## 2022-11-04 RX ADMIN — PHENYLEPHRINE HYDROCHLORIDE SCH ML: 100 SOLUTION/ DROPS OPHTHALMIC at 11:10

## 2022-11-04 RX ADMIN — TETRACAINE HYDROCHLORIDE PRN ML: 5 SOLUTION OPHTHALMIC at 11:05

## 2022-11-04 RX ADMIN — TROPICAMIDE SCH ML: 10 SOLUTION/ DROPS OPHTHALMIC at 11:00

## 2022-11-04 NOTE — OPHTHALMOLOGIST PRE-OP NOTE
Pre-Operative Progress Note


H&P Reviewed


The H&P was reviewed, patient examined and no changes noted.


Date H&P Reviewed:  Nov 4, 2022


Time H&P Reviewed:  10:41


Pre-Op Dx


Cataract, Right Eye











RANDY BOATENG MD              Nov 4, 2022 11:59

## 2022-11-04 NOTE — ANESTHESIA-GENERAL POST-OP
MAC


Patient Condition


Mental Status/LOC:  Same as Preop


Cardiovascular:  Satisfactory


Nausea/Vomiting:  Absent


Respiratory:  Satisfactory


Pain:  Controlled


Complications:  Absent





Post Op Complications


Complications


None





Follow Up Care/Instructions


Patient Instructions


None needed.





Anesthesiology Discharge Order


Discharge Order


Patient is doing well, no complaints, stable vital signs, no apparent adverse 

anesthesia problems.   


No complications reported per nursing.











GUI GARIBAY CRNA           Nov 4, 2022 12:40

## 2022-11-04 NOTE — OPHTHALMOLOGY OPERATIVE REPORT
Cataract removal/placement IOL


PREOPERATIVE DIAGNOSIS: Cataract Right Eye


POSTOPERATIVE DIAGNOSIS: Cataract Right Eye





PROCEDURE: Cataract removal and placement of posterior chamber implant, right 

eye





SURGEON: Wily Boateng 





ANESTHESIA: Topical with sedation





COMPLICATIONS: None





ESTIMATED BLOOD LOSS: Minimal 





DESCRIPTION OF PROCEDURE:


After proper informed consent was obtained, the patient, a 72 female, was taken 

to the Operating Room and the right eye was anesthetized with tetracaine.  The 

right eye was then prepped and draped in the usual manner.  A wire lid speculum 

was placed. A paracentesis was made at the left hand position. Preservative free

lidocaine was injected into the anterior chamber followed by viscoelastic.  A 

clear corneal incision was made in the temporal position. A capsulorrhexis was 

preformed and the central nuclear and cortical material were removed.  The 

posterior capsule was polished and Johan  19.5 AU00T0  IOL was placed into the 

capsular bag. The residual viscoelastic was aspirated and balanced saline 

solution was injected into the anterior chamber. Moxifloxacin  was injected into

the anterior chamber.





The wound was checked and found to be water tight.





The patient tolerated the procedure well without complications.











WILY BOATENG MD              Nov 4, 2022 12:00

## 2022-11-14 ENCOUNTER — HOSPITAL ENCOUNTER (OUTPATIENT)
Dept: HOSPITAL 75 - PREOP | Age: 72
Discharge: HOME | End: 2022-11-14
Attending: SPECIALIST
Payer: MEDICARE

## 2022-11-14 DIAGNOSIS — Z01.818: Primary | ICD-10-CM

## 2022-11-18 ENCOUNTER — HOSPITAL ENCOUNTER (OUTPATIENT)
Dept: HOSPITAL 75 - SDC | Age: 72
Discharge: HOME | End: 2022-11-18
Attending: SPECIALIST
Payer: MEDICARE

## 2022-11-18 VITALS — DIASTOLIC BLOOD PRESSURE: 72 MMHG | SYSTOLIC BLOOD PRESSURE: 169 MMHG

## 2022-11-18 VITALS — WEIGHT: 220.46 LBS | HEIGHT: 63.98 IN | BODY MASS INDEX: 37.64 KG/M2

## 2022-11-18 VITALS — DIASTOLIC BLOOD PRESSURE: 73 MMHG | SYSTOLIC BLOOD PRESSURE: 128 MMHG

## 2022-11-18 VITALS — DIASTOLIC BLOOD PRESSURE: 79 MMHG | SYSTOLIC BLOOD PRESSURE: 179 MMHG

## 2022-11-18 DIAGNOSIS — Z87.891: ICD-10-CM

## 2022-11-18 DIAGNOSIS — E66.9: ICD-10-CM

## 2022-11-18 DIAGNOSIS — H25.9: ICD-10-CM

## 2022-11-18 DIAGNOSIS — E11.36: Primary | ICD-10-CM

## 2022-11-18 DIAGNOSIS — Z79.84: ICD-10-CM

## 2022-11-18 PROCEDURE — 66984 XCAPSL CTRC RMVL W/O ECP: CPT

## 2022-11-18 RX ADMIN — TETRACAINE HYDROCHLORIDE PRN ML: 5 SOLUTION OPHTHALMIC at 08:06

## 2022-11-18 RX ADMIN — TROPICAMIDE SCH ML: 10 SOLUTION/ DROPS OPHTHALMIC at 08:29

## 2022-11-18 RX ADMIN — TROPICAMIDE SCH ML: 10 SOLUTION/ DROPS OPHTHALMIC at 08:13

## 2022-11-18 RX ADMIN — TETRACAINE HYDROCHLORIDE PRN ML: 5 SOLUTION OPHTHALMIC at 08:29

## 2022-11-18 RX ADMIN — PHENYLEPHRINE HYDROCHLORIDE SCH ML: 100 SOLUTION/ DROPS OPHTHALMIC at 08:18

## 2022-11-18 RX ADMIN — TETRACAINE HYDROCHLORIDE PRN ML: 5 SOLUTION OPHTHALMIC at 08:18

## 2022-11-18 RX ADMIN — TETRACAINE HYDROCHLORIDE PRN ML: 5 SOLUTION OPHTHALMIC at 08:13

## 2022-11-18 RX ADMIN — TROPICAMIDE SCH ML: 10 SOLUTION/ DROPS OPHTHALMIC at 08:18

## 2022-11-18 RX ADMIN — PHENYLEPHRINE HYDROCHLORIDE SCH ML: 100 SOLUTION/ DROPS OPHTHALMIC at 08:29

## 2022-11-18 RX ADMIN — PHENYLEPHRINE HYDROCHLORIDE SCH ML: 100 SOLUTION/ DROPS OPHTHALMIC at 08:13

## 2022-11-18 NOTE — OPHTHALMOLOGIST PRE-OP NOTE
Pre-Operative Progress Note


H&P Reviewed


The H&P was reviewed, patient examined and no changes noted.


Date H&P Reviewed:  Nov 18, 2022


Time H&P Reviewed:  08:41


Pre-Op Dx


Cataract, Left Eye











RANDY BOATENG MD             Nov 18, 2022 08:41

## 2022-11-18 NOTE — OPHTHALMOLOGY OPERATIVE REPORT
Cataract removal/placement IOL


PREOPERATIVE DIAGNOSIS:    Cataract Left Eye


POSTOPERATIVE DIAGNOSIS: Cataract Left Eye





PROCEDURE: Cataract removal and placement of posterior chamber implant, left eye





SURGEON: Wily Boateng 





ANESTHESIA: Topical with sedation





COMPLICATIONS: None





ESTIMATED BLOOD LOSS: Minimal 





DESCRIPTION OF PROCEDURE:


After proper informed consent was obtained, the patient, a 72 female, was taken 

to the Operating Room and the left eye was anesthetized with tetracaine.  The 

left eye was then prepped and draped in the usual manner.  A wire lid speculum 

was placed. A paracentesis was made at the left hand position. Preservative free

lidocaine was injected into the anterior chamber followed by viscoelastic.  A 

clear corneal incision was made in the temporal position. A capsulorrhexis was 

preformed and the central nuclear and cortical material were removed.  The 

posterior capsule was polished and an Johan 19.5 AU00T0 was placed into the 

capsular bag. The residual viscoelastic was aspirated and balanced saline 

solution was injected into the anterior chamber.  Moxifloxacin was injected into

the anterior chamber.





The wound was checked and found to be water tight.





The patient tolerated the procedure well without complications.











WILY BOATENG MD             Nov 18, 2022 09:00

## 2022-11-18 NOTE — ANESTHESIA-GENERAL POST-OP
MAC


Patient Condition


Mental Status/LOC:  Same as Preop


Cardiovascular:  Satisfactory


Nausea/Vomiting:  Absent


Respiratory:  Satisfactory


Pain:  Controlled


Complications:  Absent





Post Op Complications


Complications


None





Follow Up Care/Instructions


Patient Instructions


None needed.





Anesthesiology Discharge Order


Discharge Order


Patient is doing well, no complaints, stable vital signs, no apparent adverse 

anesthesia problems.   


No complications reported per nursing.











KEREN DIAZ CRNA            Nov 18, 2022 14:05

## 2022-12-13 ENCOUNTER — HOSPITAL ENCOUNTER (OUTPATIENT)
Dept: HOSPITAL 75 - PREOP | Age: 72
LOS: 1 days | End: 2022-12-14
Attending: SURGERY
Payer: MEDICARE

## 2022-12-13 VITALS — WEIGHT: 218.26 LBS | BODY MASS INDEX: 37.26 KG/M2 | HEIGHT: 64.02 IN

## 2022-12-13 DIAGNOSIS — Z01.818: Primary | ICD-10-CM

## 2022-12-20 ENCOUNTER — HOSPITAL ENCOUNTER (OUTPATIENT)
Dept: HOSPITAL 75 - ENDO | Age: 72
Discharge: HOME | End: 2022-12-20
Attending: SURGERY
Payer: MEDICARE

## 2022-12-20 VITALS — SYSTOLIC BLOOD PRESSURE: 139 MMHG | DIASTOLIC BLOOD PRESSURE: 67 MMHG

## 2022-12-20 VITALS — SYSTOLIC BLOOD PRESSURE: 140 MMHG | DIASTOLIC BLOOD PRESSURE: 66 MMHG

## 2022-12-20 VITALS — DIASTOLIC BLOOD PRESSURE: 66 MMHG | SYSTOLIC BLOOD PRESSURE: 140 MMHG

## 2022-12-20 VITALS — WEIGHT: 218.26 LBS | BODY MASS INDEX: 37.72 KG/M2 | HEIGHT: 63.86 IN

## 2022-12-20 VITALS — DIASTOLIC BLOOD PRESSURE: 95 MMHG | SYSTOLIC BLOOD PRESSURE: 188 MMHG

## 2022-12-20 DIAGNOSIS — G47.33: ICD-10-CM

## 2022-12-20 DIAGNOSIS — Z87.891: ICD-10-CM

## 2022-12-20 DIAGNOSIS — K29.50: Primary | ICD-10-CM

## 2022-12-20 DIAGNOSIS — Z99.89: ICD-10-CM

## 2022-12-20 DIAGNOSIS — Z87.19: ICD-10-CM

## 2022-12-20 DIAGNOSIS — E66.9: ICD-10-CM

## 2022-12-20 DIAGNOSIS — K44.9: ICD-10-CM

## 2022-12-20 NOTE — DISCHARGE INST-SIMPLE/STANDARD
Discharge Inst-Standard


Patient Instructions/Follow Up


Plan of Care/Instructions/FU:  


2 weeks Kelly


Activity as Tolerated:  Yes


Discharge Diet:  Regular Diet











FAISAL MERCADO DO              Dec 20, 2022 11:56

## 2022-12-20 NOTE — PROGRESS NOTE-PRE OPERATIVE
Pre-Operative Progress Note


Date of Available H&P:  Nov 21, 2022


Date H&P Reviewed:  Dec 20, 2022


Time H&P Reviewed:  11:40


Pre-Operative Diagnosis:  History of Hendricks's esophagus, GERD











FAISAL MERCADO DO              Dec 20, 2022 11:43

## 2022-12-20 NOTE — ANESTHESIA-GENERAL POST-OP
MAC


Patient Condition


Mental Status/LOC:  Same as Preop


Cardiovascular:  Satisfactory


Nausea/Vomiting:  Absent


Respiratory:  Satisfactory


Pain:  Controlled


Complications:  Absent





Post Op Complications


Complications


None





Follow Up Care/Instructions


Patient Instructions


None needed.





Anesthesiology Discharge Order


Discharge Order


Patient is doing well, no complaints, stable vital signs, no apparent adverse 

anesthesia problems.   


No complications reported per nursing.











GUI GARIBAY CRNA          Dec 20, 2022 14:35

## 2022-12-20 NOTE — OPERATIVE REPORT
DATE OF SERVICE: 12/20/2022



PREOPERATIVE DIAGNOSES:

Gastroesophageal reflux disease, history of Hendricks's.



POSTOPERATIVE DIAGNOSIS:

Hiatal hernia.



PROCEDURES:

EGD with biopsy.



SURGEON:

Faisal Mendoza DO



ANESTHESIA:

Per CRNA.



ESTIMATED BLOOD LOSS:

None.



COMPLICATIONS:

None.



INDICATIONS:

The patient is a 72-year-old female with history of Hendricks's and GERD symptoms.

 She understands risks and benefits of the procedure and wishes to proceed.  

Consent was signed and in the chart.



DESCRIPTION OF PROCEDURE:

The patient was taken to the endoscopy suite, placed in the left lateral 

recumbent position.  Timeout was performed.  Scope was inserted in the mouth, 

down the esophagus, stomach and duodenum without difficulty.  No polyps, masses 

or ulcerations.  Scope was slowly retracted back into the stomach, where it was 

further insufflated.  No polyps, masses, or ulcerations.  Biopsy of antrum was 

obtained.  Scope was retroflexed noting a hiatal hernia.  No other pathology.  

Scope was returned to its normal position, slowly withdrawn to distal esophagus,

four quadrant biopsy short segment Hendricks's appearance obtained.  No other 

pathology.  Scope was slowly retracted back to completely remove, noting no 

other pathology.  The patient tolerated the procedure well without any 

complications.  She was taken to recovery room in stable condition.



RECOMMENDATIONS:

The patient continue on current medications.  Will follow up in the office in 2 

weeks to discuss pathology results.  Recommend repeat EGD in 2-3 years.





Job ID: [id number]

DocumentID: [id number]

Dictated Date: 12/20/2022 11:53:53

Transcription Date: 12/20/2022 21:03:00

Dictated By: FAISAL MENDOZA DO

## 2024-03-13 NOTE — ANESTHESIA-GENERAL POST-OP
General


Patient Condition


Mental Status/LOC:  Same as Preop


Cardiovascular:  Satisfactory


Nausea/Vomiting:  Absent


Respiratory:  Satisfactory


Pain:  Controlled


Complications:  Absent





Post Op Complications


Complications


None





Follow Up Care/Instructions


Patient Instructions


None needed.





Anesthesia/Patient Condition


Patient Condition


Patient is doing well, no complaints, stable vital signs, no apparent adverse 

anesthesia problems.   


No complications reported per nursing.


D/C home per [facility] Criteria:  Yes











KATRIN LARSON CRNA           Jun 18, 2020 14:45 No